# Patient Record
Sex: FEMALE | Race: BLACK OR AFRICAN AMERICAN | NOT HISPANIC OR LATINO | Employment: FULL TIME | URBAN - METROPOLITAN AREA
[De-identification: names, ages, dates, MRNs, and addresses within clinical notes are randomized per-mention and may not be internally consistent; named-entity substitution may affect disease eponyms.]

---

## 2017-01-03 ENCOUNTER — TELEPHONE (OUTPATIENT)
Dept: ORTHOPEDICS | Facility: CLINIC | Age: 27
End: 2017-01-03

## 2017-01-03 ENCOUNTER — OFFICE VISIT (OUTPATIENT)
Dept: ENDOCRINOLOGY | Facility: CLINIC | Age: 27
End: 2017-01-03
Payer: COMMERCIAL

## 2017-01-03 VITALS
SYSTOLIC BLOOD PRESSURE: 116 MMHG | HEIGHT: 67 IN | WEIGHT: 264.31 LBS | BODY MASS INDEX: 41.48 KG/M2 | HEART RATE: 110 BPM | DIASTOLIC BLOOD PRESSURE: 84 MMHG

## 2017-01-03 DIAGNOSIS — E66.01 MORBID OBESITY DUE TO EXCESS CALORIES: ICD-10-CM

## 2017-01-03 DIAGNOSIS — M25.561 RIGHT KNEE PAIN, UNSPECIFIED CHRONICITY: Primary | ICD-10-CM

## 2017-01-03 PROCEDURE — 1159F MED LIST DOCD IN RCRD: CPT | Mod: S$GLB,,, | Performed by: INTERNAL MEDICINE

## 2017-01-03 PROCEDURE — 99999 PR PBB SHADOW E&M-EST. PATIENT-LVL III: CPT | Mod: PBBFAC,,, | Performed by: INTERNAL MEDICINE

## 2017-01-03 PROCEDURE — 99214 OFFICE O/P EST MOD 30 MIN: CPT | Mod: S$GLB,,, | Performed by: INTERNAL MEDICINE

## 2017-01-03 RX ORDER — TESTOSTERONE CYPIONATE 200 MG/ML
100 INJECTION, SOLUTION INTRAMUSCULAR
Qty: 10 ML | Refills: 5 | Status: SHIPPED | OUTPATIENT
Start: 2017-01-03 | End: 2017-01-04

## 2017-01-03 NOTE — MR AVS SNAPSHOT
[facility] - Endo/Diab/Metab  [address]  [facility] [address]  Phone: [phone]  Fax: [phone]                  Lianne Mancia   1/3/2017 10:30 AM   Office Visit    Description:  Female : 1990   Provider:  Tania Rich MD   Department:  [facility] Endo/Diab/Metab           Reason for Visit     Gender dysphoria           Diagnoses this Visit        Comments    Endocrine disorder in female-to-male transgender person    -  Primary     Morbid obesity due to excess calories                To Do List           Future Appointments        Provider Department Dept Phone    1/3/2017 12:15 PM LAB, APPOINTMENT [place] [facility] [phone]    2017 8:00 AM [facility] - Orthopedics [phone]      Goals (5 Years of Data)     None      Follow-Up and Disposition     Return in about 3 months (around 4/3/2017).       These Medications        Disp Refills Start End    testosterone cypionate (DEPOTESTOTERONE CYPIONATE) 200 mg/mL injection 10 mL 5 1/3/2017 2017    Inject 0.5 mLs (100 mg total) into the muscle every 7 days. 3 ml syringe with 18 g needle  to draw  X 10   21 g 1 inch needle to inject x 10 - Intramuscular    Pharmacy: [facility] [place] [address].  #: [phone]         [facility]     [facility] Nurse Care [facility] -  Assistance  Registered nurses in the [facility] provide clinical advisement, health education, appointment booking, and other advisory services.  Call for this free service at [phone].             Medications           Message regarding Medications     Verify the changes and/or additions to your medication regime listed below are the same as discussed with your clinician today.  If any of these changes or additions are incorrect, please notify your healthcare provider.        CHANGE how you are taking these medications   "   Start Taking Instead of    testosterone cypionate (DEPOTESTOTERONE CYPIONATE) 200 mg/mL injection testosterone cypionate (DEPOTESTOTERONE CYPIONATE) 200 mg/mL injection    Dosage:  Inject 0.5 mLs (100 mg total) into the muscle every 7 days. 3 ml syringe with 18 g needle  to draw  X 10   21 g 1 inch needle to inject x 10 Dosage:  Inject 0.25 mLs (50 mg total) into the muscle every 7 days. 3 ml syringe with 18 g needle  to draw  X 10   21 g 1 inch needle to inject x 10    Reason for Change:  Reorder       STOP taking these medications     medroxyPROGESTERone (DEPO-PROVERA) 150 mg/mL injection Inject 150 mg into the muscle every 3 (three) months.           Verify that the below list of medications is an accurate representation of the medications you are currently taking.  If none reported, the list may be blank. If incorrect, please contact your healthcare provider. Carry this list with you in case of emergency.           Current Medications     meloxicam (MOBIC) 15 MG tablet Take 1 tablet (15 mg total) by mouth once daily.    testosterone cypionate (DEPOTESTOTERONE CYPIONATE) 200 mg/mL injection Inject 0.5 mLs (100 mg total) into the muscle every 7 days. 3 ml syringe with 18 g needle  to draw  X 10   21 g 1 inch needle to inject x 10           Clinical Reference Information           Vital Signs - Last Recorded  Most recent update: 1/3/2017 11:08 AM by Franco Mcfarland MA    BP Pulse Ht Wt BMI    116/84 110 5' 6.5" (1.689 m) 119.9 kg (264 lb 5.3 oz) 42.03 kg/m2      Blood Pressure          Most Recent Value    BP  116/84      Allergies as of 1/3/2017     No Known Allergies      Immunizations Administered on Date of Encounter - 1/3/2017     None      Orders Placed During Today's Visit     Future Labs/Procedures Expected by Expires    CBC auto differential  1/3/2017 1/3/2018    Comprehensive metabolic panel  1/3/2017 1/3/2018    Hemoglobin A1c  1/3/2017 1/3/2018    Lipid panel  1/3/2017 1/3/2018    TSH  1/3/2017 " 1/3/2018    Vitamin D  1/3/2017 1/3/2018

## 2017-01-03 NOTE — PROGRESS NOTES
Subjective:      Patient ID: Lianne Mancia is a 26 y.o. female.    Chief Complaint:  Gender dysphoria      History of Present Illness    Here for his  trans care    First identified as a man in       started cross hormone therapy 9/20/16  trt dose  50 mg q  Week - fridays   Was On depo provera -would be due for an injection this week   He is pleased with the changes so far  No concerns   He is self injecting          Therapist was Benny Fraga   Letter was provided attesting to readiness for TRT       Relationships women - new online relationship  - supposed to see her in April in [place] - she is not aware         Friend supportive   Mother not supportive but she aware (she does not know he is on trt)   his sister is supportive      Out at work? Work does not know           No recent menses  - lmp 2015 before provera        tob - no  etoh - no   Drugs - no     Denies polyuria, polydipsia, nocturia, unexplained weight loss or blurred vision  Able to lose weight with effort but it has not been a priority recently     Knee injury in October has limited ability to exercise     Review of Systems   Constitutional: Negative for unexpected weight change.   Eyes: Negative for visual disturbance.   Respiratory: Negative for shortness of breath.    Cardiovascular: Negative for chest pain.   Gastrointestinal: Negative for abdominal pain.   Musculoskeletal: Positive for arthralgias.   Skin: Negative for wound.   Neurological: Negative for headaches.   Hematological: Does not bruise/bleed easily.   Psychiatric/Behavioral: Negative for sleep disturbance.       Objective:   Physical Exam   Neck: No thyromegaly present.   Cardiovascular: Normal rate.    Pulmonary/Chest: Effort normal.   Abdominal: Soft.   Musculoskeletal: She exhibits no edema.   Vitals reviewed.  +acanthosis and skin tags       Lab Review:   Results for orders placed or performed in visit on 01/15/15   Iron and TIBC   Result Value Ref Range    Iron 68  30 - 160 ug/dL    Transferrin 290 200 - 375 mg/dL    TIBC 429 250 - 450 ug/dL    Saturated Iron 16 (L) 20 - 50 %   CBC auto differential   Result Value Ref Range    WBC 7.52 3.90 - 12.70 K/uL    RBC 4.20 4.00 - 5.40 M/uL    Hemoglobin 11.7 (L) 12.0 - 16.0 g/dL    Hematocrit 36.0 (L) 37.0 - 48.5 %    MCV 86 82 - 98 fL    MCH 27.9 27.0 - 31.0 pg    MCHC 32.5 32.0 - 36.0 %    RDW 15.2 (H) 11.5 - 14.5 %    Platelets 368 (H) 150 - 350 K/uL    MPV 11.4 9.2 - 12.9 fL    Gran # 3.6 1.8 - 7.7 K/uL    Lymph # 3.0 1.0 - 4.8 K/uL    Mono # 0.8 0.3 - 1.0 K/uL    Eos # 0.1 0.0 - 0.5 K/uL    Baso # 0.02 0.00 - 0.20 K/uL    Gran% 47.3 38.0 - 73.0 %    Lymph% 40.4 18.0 - 48.0 %    Mono% 10.5 4.0 - 15.0 %    Eosinophil% 1.1 0.0 - 8.0 %    Basophil% 0.3 0.0 - 1.9 %    Differential Method Automated    Ferritin   Result Value Ref Range    Ferritin 20 20.0 - 300.0 ng/mL         Assessment:   1 Gender dysphoria-  Reviewed therapy, side effects (both wanted and unwanted), possible adverse outcomes, expectations, compliance.  Reviewed limited data on fertility      Patient encouraged to continue working with his therapist during the transition process.      Will increase Testosterone replacement therapy 100 mg q  Week    labs today   RTO in 3 months with labs     Noting  Nl hh/ for men is:     Hemoglobin 14.0-18.0 g/dL    Hematocrit 40.0-54.0 %        Healthy lifestyle stressed  Discussed the need for a Pap.      2  Morbid obesity with insulin resistance on exam - alerted as to the increased risk for t2DM  Stressed diet and exercise  Goal 5-7% weight loss   May use metformin in the future   Periodic hba1c levels

## 2017-01-04 ENCOUNTER — HOSPITAL ENCOUNTER (OUTPATIENT)
Dept: RADIOLOGY | Facility: HOSPITAL | Age: 27
Discharge: HOME | End: 2017-01-04
Attending: PHYSICIAN ASSISTANT
Payer: COMMERCIAL

## 2017-01-04 ENCOUNTER — OFFICE VISIT (OUTPATIENT)
Dept: ORTHOPEDICS | Facility: CLINIC | Age: 27
End: 2017-01-04
Payer: COMMERCIAL

## 2017-01-04 VITALS
HEART RATE: 84 BPM | HEIGHT: 67 IN | WEIGHT: 264 LBS | BODY MASS INDEX: 41.44 KG/M2 | SYSTOLIC BLOOD PRESSURE: 147 MMHG | DIASTOLIC BLOOD PRESSURE: 93 MMHG

## 2017-01-04 DIAGNOSIS — M25.561 ACUTE PAIN OF RIGHT KNEE: Primary | ICD-10-CM

## 2017-01-04 DIAGNOSIS — S80.01XA CONTUSION OF KNEE, RIGHT: ICD-10-CM

## 2017-01-04 DIAGNOSIS — S72.411D CLOSED DISPLACED FRACTURE OF CONDYLE OF RIGHT FEMUR WITH ROUTINE HEALING, SUBSEQUENT ENCOUNTER: Primary | ICD-10-CM

## 2017-01-04 DIAGNOSIS — S83.004D PATELLAR DISLOCATION, RIGHT, SUBSEQUENT ENCOUNTER: ICD-10-CM

## 2017-01-04 DIAGNOSIS — M25.561 RIGHT KNEE PAIN, UNSPECIFIED CHRONICITY: ICD-10-CM

## 2017-01-04 PROCEDURE — 73562 X-RAY EXAM OF KNEE 3: CPT | Mod: 26,RT,, | Performed by: RADIOLOGY

## 2017-01-04 PROCEDURE — 99999 PR PBB SHADOW E&M-EST. PATIENT-LVL III: CPT | Mod: PBBFAC,,, | Performed by: PHYSICIAN ASSISTANT

## 2017-01-04 PROCEDURE — 1159F MED LIST DOCD IN RCRD: CPT | Mod: S$GLB,,, | Performed by: PHYSICIAN ASSISTANT

## 2017-01-04 PROCEDURE — 73560 X-RAY EXAM OF KNEE 1 OR 2: CPT | Mod: TC,59,LT

## 2017-01-04 PROCEDURE — 99024 POSTOP FOLLOW-UP VISIT: CPT | Mod: S$GLB,,, | Performed by: PHYSICIAN ASSISTANT

## 2017-01-04 PROCEDURE — 73560 X-RAY EXAM OF KNEE 1 OR 2: CPT | Mod: 26,59,, | Performed by: RADIOLOGY

## 2017-01-04 NOTE — PROGRESS NOTES
"Subjective:      Patient ID: Lianne Mancia is a 26 y.o. female.    Chief Complaint: Pain of the Right Knee    HPI   The patient presents to clinic for a four-week follow-up of right knee discomfort secondary to bone contusion and lateral femoral condyle fracture, lateral subluxation of patella and a tear of the medial patellar retinaculum. The patient sustained the injury on 10/24/16.  He states that overall his pain has significantly improved.  He continues to wear the hinged brace most of the time but takes it off whenever he is around the house.  He states that without it, he feels a bit unstable and starts to have a "sore" feeling on both sides of his knee.  He denies "giving out sensation"  of his knee.  He currently does not complain of any pain.    Review of Systems   Constitution: Negative for chills and fever.   Gastrointestinal: Negative for abdominal pain, diarrhea, nausea and vomiting.         Objective:        Ortho/SPM Exam   The patient does not have any pain or tenderness on exam today.  Cruciate and collateral ligaments are intact.   His swelling has significantly improved.  Range of motion is approximately 0-110.  He ambulates with a normal gait.      X-rays:   Comparison: 12/05/2016    Bilateral standing ap knees  bilateral sunrise views  right lateral    Findings:    Stable appearance post traumatic findings lung the lateral margin the RIGHT lateral femoral condyle.  Persistent but decreased RIGHT to suprapatellar effusion.  Persistent small linear calcific density noted projecting on the posterior inferior margin of the RIGHT patella on the lateral view.  Slight difference in appearance most likely positional.  Remainder of the exam is stable.  There is RIGHT juxta-articular osteopenia.        Assessment:       Encounter Diagnoses   Name Primary?    Closed displaced fracture of condyle of right femur with routine healing, subsequent encounter Yes    Patellar dislocation, right, " subsequent encounter     Contusion of knee, right           Plan:       Lianne was seen today for pain.    Diagnoses and all orders for this visit:    Closed displaced fracture of condyle of right femur with routine healing, subsequent encounter    Patellar dislocation, right, subsequent encounter    Contusion of knee, right      Patient will continue wearing the hinged knee brace as needed.  He is cleared to resume regular duty work.  The patient was instructed on at-home exercises. The patient does not want to go to outpatient physical therapy.    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  He is wanting to work at [employer].  If the patient has any questions, concerns or increased pain he'll return in 6 weeks for follow-up.

## 2017-01-04 NOTE — MR AVS SNAPSHOT
[facility]  [address]  [place] [address]  Phone: [phone]  Fax: [phone]                  Lianne Mancia   2017 8:00 AM   Office Visit    Description:  Female : 1990   Provider:  Luciana Larsen PA-C   Department:  [facility]           Reason for Visit     Right Knee - Pain                To Do List           Future Appointments        Provider Department Dept Phone    2/15/2017 7:45 AM ONLH XR1- [facility] [phone]    2/15/2017 8:15 AM Luciana Larsen PA-C [facility] [phone]    2017 9:30 AM Tania Rihc MD [facility] - Endo/Diab/Metab [phone]      Goals (5 Years of Data)     None      Follow-Up and Disposition     Return in about 6 weeks (around 2/15/2017), or if symptoms worsen or fail to improve.      [facility]     [facility] Care [facility] -  Assistance  Registered nurses in the [facility] provide clinical advisement, health education, appointment booking, and other advisory services.  Call for this free service at [phone].             Medications           Message regarding Medications     Verify the changes and/or additions to your medication regime listed below are the same as discussed with your clinician today.  If any of these changes or additions are incorrect, please notify your healthcare provider.        STOP taking these medications     testosterone cypionate (DEPOTESTOTERONE CYPIONATE) 200 mg/mL injection Inject 0.5 mLs (100 mg total) into the muscle every 7 days. 3 ml syringe with 18 g needle  to draw  X 10   21 g 1 inch needle to inject x 10           Verify that the below list of medications is an accurate representation of the medications you are currently taking.  If none reported, the list may be blank. If incorrect, please contact your healthcare provider. Carry this list with you in case of emergency.           Current  "Medications     meloxicam (MOBIC) 15 MG tablet Take 1 tablet (15 mg total) by mouth once daily.           Clinical Reference Information           Vital Signs - Last Recorded  Most recent update: 1/4/2017  8:23 AM by Katy Tee LPN    BP Pulse Ht Wt BMI    (!) 147/93 (BP Location: Right arm, Patient Position: Sitting, BP Method: Automatic) 84 5' 6.5" (1.689 m) 119.7 kg (264 lb) 41.97 kg/m2      Blood Pressure          Most Recent Value    BP  (!)  147/93      Allergies as of 1/4/2017     No Known Allergies      Immunizations Administered on Date of Encounter - 1/4/2017     None      "

## 2017-01-19 ENCOUNTER — TELEPHONE (OUTPATIENT)
Dept: ORTHOPEDICS | Facility: CLINIC | Age: 27
End: 2017-01-19

## 2017-01-19 NOTE — TELEPHONE ENCOUNTER
----- Message from Paolo Meza sent at 1/19/2017  7:48 AM CST -----  Contact: [phone]  Pt is calling to find out if paperwork for work is ready to be picked up. Pt can be reached at [phone]

## 2017-01-19 NOTE — TELEPHONE ENCOUNTER
Spoke with patient and let him know that his paper work should be ready tomorrow and I will call him when it is ready for .

## 2017-01-20 ENCOUNTER — TELEPHONE (OUTPATIENT)
Dept: ORTHOPEDICS | Facility: CLINIC | Age: 27
End: 2017-01-20

## 2017-02-15 ENCOUNTER — OFFICE VISIT (OUTPATIENT)
Dept: ORTHOPEDICS | Facility: CLINIC | Age: 27
End: 2017-02-15
Payer: COMMERCIAL

## 2017-02-15 ENCOUNTER — HOSPITAL ENCOUNTER (OUTPATIENT)
Dept: RADIOLOGY | Facility: HOSPITAL | Age: 27
Discharge: HOME | End: 2017-02-15
Attending: ORTHOPAEDIC SURGERY
Payer: COMMERCIAL

## 2017-02-15 VITALS
DIASTOLIC BLOOD PRESSURE: 86 MMHG | SYSTOLIC BLOOD PRESSURE: 136 MMHG | WEIGHT: 250 LBS | HEART RATE: 87 BPM | BODY MASS INDEX: 40.18 KG/M2 | HEIGHT: 66 IN

## 2017-02-15 DIAGNOSIS — S72.411D CLOSED DISPLACED FRACTURE OF CONDYLE OF RIGHT FEMUR WITH ROUTINE HEALING, SUBSEQUENT ENCOUNTER: Primary | ICD-10-CM

## 2017-02-15 DIAGNOSIS — M25.561 ACUTE PAIN OF RIGHT KNEE: ICD-10-CM

## 2017-02-15 DIAGNOSIS — S86.811D: ICD-10-CM

## 2017-02-15 DIAGNOSIS — S83.004D PATELLAR DISLOCATION, RIGHT, SUBSEQUENT ENCOUNTER: ICD-10-CM

## 2017-02-15 DIAGNOSIS — S80.01XA CONTUSION OF KNEE, RIGHT: ICD-10-CM

## 2017-02-15 DIAGNOSIS — S83.001D: ICD-10-CM

## 2017-02-15 PROCEDURE — 73560 X-RAY EXAM OF KNEE 1 OR 2: CPT | Mod: 26,59,LT, | Performed by: RADIOLOGY

## 2017-02-15 PROCEDURE — 99999 PR PBB SHADOW E&M-EST. PATIENT-LVL III: CPT | Mod: PBBFAC,,, | Performed by: PHYSICIAN ASSISTANT

## 2017-02-15 PROCEDURE — 73560 X-RAY EXAM OF KNEE 1 OR 2: CPT | Mod: TC,59,LT

## 2017-02-15 PROCEDURE — 73562 X-RAY EXAM OF KNEE 3: CPT | Mod: 26,RT,, | Performed by: RADIOLOGY

## 2017-02-15 PROCEDURE — 99214 OFFICE O/P EST MOD 30 MIN: CPT | Mod: S$GLB,,, | Performed by: PHYSICIAN ASSISTANT

## 2017-02-15 RX ORDER — SYRINGE WITH NEEDLE, 1 ML 25GX5/8"
SYRINGE, EMPTY DISPOSABLE MISCELLANEOUS
Refills: 0 | COMMUNITY
Start: 2017-01-18 | End: 2017-12-09

## 2017-02-15 RX ORDER — TESTOSTERONE CYPIONATE 200 MG/ML
200 INJECTION, SOLUTION INTRAMUSCULAR
Refills: 0 | COMMUNITY
Start: 2017-01-18 | End: 2017-06-19

## 2017-02-15 NOTE — PROGRESS NOTES
Subjective:      Patient ID: Lianne Mancia is a 26 y.o. female.    Chief Complaint: Injury of the Right Knee    HPI Comments: Body part: Right Knee    Occupation: Sterile Processing Technician/ [employer]    Dominant hand: Right    Referred by: Riri Monson MD    Date of Injury: 10/22/2016    Problem Description: Right Knee Injury from a fall.  She suffered a lateral patellar dislocation with lateral femoral condyle fracture and medial retinacular tear.  There is diffuse bone bruising in the finger.  She has worn a hinged knee brace quite consistently.  She continues to have crepitus in the knee that she is concerned about.  No significant pain otherwise.  There is minimal swelling.          Injury   Episode onset: 10/22/2016. The problem occurs rarely. The problem has been gradually improving. Pertinent negatives include no abdominal pain, chest pain, chills, congestion, coughing, fever, joint swelling, nausea, numbness, rash or vomiting. Nothing aggravates the symptoms. She has tried NSAIDs (knee brace) for the symptoms. The treatment provided moderate relief.       Review of Systems   Constitution: Negative for chills, fever and weight loss.   HENT: Negative for congestion and hearing loss.    Eyes: Negative for double vision and pain.   Cardiovascular: Negative for chest pain and irregular heartbeat.   Respiratory: Negative for cough and shortness of breath.    Endocrine: Negative for polyuria.   Hematologic/Lymphatic: Does not bruise/bleed easily.   Skin: Negative for poor wound healing, rash and suspicious lesions.   Musculoskeletal: Negative for arthritis and joint swelling.   Gastrointestinal: Negative for abdominal pain, nausea and vomiting.   Genitourinary: Negative for bladder incontinence and frequency.   Neurological: Negative for loss of balance, numbness, paresthesias, sensory change and tremors.   Psychiatric/Behavioral: Positive for depression. The patient is  nervous/anxious.    Allergic/Immunologic: Negative for hives.         Objective:            General    Nursing note and vitals reviewed.  Constitutional: She is oriented to person, place, and time. She appears well-developed and well-nourished. No distress.   Neurological: She is alert and oriented to person, place, and time.   Psychiatric: She has a normal mood and affect. Her behavior is normal. Judgment and thought content normal.     General Musculoskeletal Exam   Gait: normal       Right Knee Exam     Inspection   Scars: absent  Swelling: present  Effusion: present  Deformity: deformity  Bruising: absent    Tenderness   The patient is tender to palpation of the patella.    Crepitus   The patient has crepitus of the patella.    Range of Motion   The patient has normal right knee ROM.    Tests   Ligament Examination   MCL - Valgus: normal (0 to 2mm)  LCL - Varus: normal  Patella   Passive Patellar Tilt: lateral tilt  Patellar Grind: positive    Other   Sensation: normal    Left Knee Exam   Left knee exam is normal.    Range of Motion   The patient has normal left knee ROM.    Tests   Stability   MCL - Valgus: normal (0 to 2mm)  LCL - Varus: normal (0 to 2mm)  Patella   Passive Patellar Tilt: squinting  Patellar Grind: negative    Other   Sensation: normal    Muscle Strength   Right Lower Extremity   Hip Abduction: 4/5   Quadriceps:  4/5   Hamstrin/5   Left Lower Extremity   Hip Abduction: 4/5   Quadriceps:  4/5 and 5/5   Hamstrin/5 and 4/5     Vascular Exam       Edema  Right Lower Leg: absent  Left Lower Leg: absent    I have reviewed the films and report. I agree with the radiologist interpretation of the radiographic findings:  The joint spaces are well-maintained.  There is a lucency seen along the lateral aspect of the right lateral femoral condyle which is presumed to represent the site of prior fracture.  A fracture fragment is not as well demonstrated as the prior exam.        Assessment:        Encounter Diagnoses   Name Primary?    Closed displaced fracture of condyle of right femur with routine healing, subsequent encounter Yes    Patellar dislocation, right, subsequent encounter     Contusion of knee, right     Subluxation of patella, right, subsequent encounter     Traumatic medial retinacular tear of knee, right, subsequent encounter           Plan:       Lianne was seen today for injury.    Diagnoses and all orders for this visit:    Closed displaced fracture of condyle of right femur with routine healing, subsequent encounter  -     Ambulatory Referral to Physical/Occupational Therapy    Patellar dislocation, right, subsequent encounter  -     Ambulatory Referral to Physical/Occupational Therapy    Contusion of knee, right  -     Ambulatory Referral to Physical/Occupational Therapy    Subluxation of patella, right, subsequent encounter  -     Ambulatory Referral to Physical/Occupational Therapy    Traumatic medial retinacular tear of knee, right, subsequent encounter  -     Ambulatory Referral to Physical/Occupational Therapy      We reviewed the x-rays in the room.  There is significant crepitus on examination.  There is concern that she may need an MPFL reconstruction, but I like to have her complete a round of physical therapy prior to considering this to specifically concentrate on VMO toning to better control the patella.  I'll see her back in approximately 7 weeks with consideration for repeat MRI.  She will continue the hinged knee brace while at work  The patient understands, chooses and consents to this plan and accepts all   the risks which include but are not limited to the risks mentioned above.   Pt understands the alternative of having no testing, intervention or       treatment at this time. Pt left content and without questions.     Disclaimer: This note was prepared using a voice recognition system and is likely to have sound alike errors within the text.

## 2017-02-15 NOTE — LETTER
February 15, 2017      Riri Monson MD  [address] [place] [address] Dr [address] [place] [id number]           [facility]  [address] [place] [address] Nestor [place] [address]  Phone: [phone]  Fax: [phone]          Patient: Lianne Mancia   MR Number: [id number]   YOB: 1990   Date of Visit: 2/15/2017       Dear Dr. Riri Monson:    Thank you for referring Lianne Mancia to me for evaluation. Attached you will find relevant portions of my assessment and plan of care.    If you have questions, please do not hesitate to call me. I look forward to following Lianne Mancia along with you.    Sincerely,    Dominic Mcgowan PA-C    Enclosure  CC:  No Recipients    If you would like to receive this communication electronically, please contact [email or web address] or [phone] to request more information on [address] Link access.    For providers and/or their staff who would like to refer a patient to [facility], please contact us through our one-stop-shop provider referral line, [facility], at [phone].    If you feel you have received this communication in error or would no longer like to receive these types of communications, please e-mail [email or web address]

## 2017-02-27 ENCOUNTER — CLINICAL SUPPORT (OUTPATIENT)
Dept: REHABILITATION | Facility: HOSPITAL | Age: 27
End: 2017-02-27
Attending: ORTHOPAEDIC SURGERY
Payer: COMMERCIAL

## 2017-02-27 DIAGNOSIS — M25.561 RIGHT KNEE PAIN, UNSPECIFIED CHRONICITY: Primary | ICD-10-CM

## 2017-02-27 PROCEDURE — 97014 ELECTRIC STIMULATION THERAPY: CPT

## 2017-02-27 PROCEDURE — 97140 MANUAL THERAPY 1/> REGIONS: CPT

## 2017-02-27 PROCEDURE — 97110 THERAPEUTIC EXERCISES: CPT

## 2017-02-27 PROCEDURE — 97161 PT EVAL LOW COMPLEX 20 MIN: CPT

## 2017-02-27 NOTE — PROGRESS NOTES
PHYSICAL THERAPY INITIAL OUTPATIENT EVALUATION    Referring Provider:  Dr. Noé Isabel    Diagnosis:       ICD-10-CM ICD-9-CM    1. Right knee pain, unspecified chronicity M25.561 719.46         Orders:  Evaluate and Treat  Date : 02/27/2017    Visit # 1    SUBJECTIVE:  Date of Injury: 10/22/2016  Right Knee Injury from a fall. She suffered a lateral patellar dislocation with lateral femoral condyle fracture and medial retinacular tear. There is diffuse bone bruising in the finger. She has worn a hinged knee brace almost all the time. Is most concerned with crepitus in the knee but reports no significant pain otherwise. There is minimal swelling remaining- was much worse.  Avoids squatting, kneeling. If walks for a long time will get general ache.     Pain: 4-5 /10 at worst, located peripatellar, described as dull ache    OBJECTIVE:    Function:  LEFS 26 % disability .    Posture/ Observation: no significant swelling/ no bruising    Gait: decreased WB / stance on right           EVAL       TODAY       Knee AROM:  Flexion      -15      Extension    115  Knee PROM  Flexion      -10      Extension    120     Hip ROM: WFL     Strength:    Quadriceps    4/5 pain    Hamstrings    5/5  Gluteus Medius   4/5   Gluteus Zeke   4+/5         Special Testing:   Ligamentous Stress: negative for all  Yuridias:    negative  Patellar Compression: positive     SLR/ Slump:  positive     Decreased tibial internal rotation , loss of full femoral/tibial extension    Palpation:  Significant crepitus on flex/ ext . Minimal tenderness on palpation to patella. Tender lateral quads / ITB     ASSESSMENT:  The patient is a 26 y.o. year old female who presents to physical therapy with complaints of patellar crepitus and knee aching following patellar subluxation and condyle fracture.  Patient's impairments include crepitus, pain, joint dysfunction and impaired gait.  These impairments are limiting patient's ability to perform work  functions and ADLs.  Patient's prognosis is good.  Patient will benefit from skilled physical therapy intervention to increase lower extremity strength and joint mobility. .    Short Term Goals:    1. Pt will report a subjective decrease in pain.   2. Pt will be instructed in home exercise program / self care   3. Pt to ambulate household distances without pain    Long Term Goals:  1. Full knee extension and flexion without pain  2. Pt able to ambulate community distances with little to no pain.  3. Quadricep strength 5/5 without pain  4. Pt able to squat to pick object off floor without pain/ crepitus.     TREATMENT PROVIDED:  -Evaluation  -Manual Therapy:  Tibial internal rotation mobs and extension mobs at fem/tib, patellar inf / sup glides   -Therapeutic Exercise:  QS 3 x 10 , SLR 3 x 10, clams 10 x 10 sec , tailgaters x 3 mins   -Modalities: IFC/ MH  -Education on condition and HEP    PLAN:  Patient will benefit from physical therapy (2-3) x/week for (4-6) weeks including manual therapy, therapeutic exercise, functional activities, modalities, and patient education.    Thank you for this referral.    These services are reasonable and necessary for the conditions set forth above while under my care.

## 2017-03-01 ENCOUNTER — CLINICAL SUPPORT (OUTPATIENT)
Dept: REHABILITATION | Facility: HOSPITAL | Age: 27
End: 2017-03-01
Attending: ORTHOPAEDIC SURGERY
Payer: COMMERCIAL

## 2017-03-01 DIAGNOSIS — M25.561 RIGHT KNEE PAIN, UNSPECIFIED CHRONICITY: Primary | ICD-10-CM

## 2017-03-01 PROCEDURE — 97110 THERAPEUTIC EXERCISES: CPT

## 2017-03-01 PROCEDURE — 97140 MANUAL THERAPY 1/> REGIONS: CPT

## 2017-03-01 PROCEDURE — 97014 ELECTRIC STIMULATION THERAPY: CPT

## 2017-03-01 NOTE — PROGRESS NOTES
PHYSICAL THERAPY OUTPATIENT VISIT    Referring Provider:  Dr. Noé Isabel    Diagnosis:       ICD-10-CM ICD-9-CM    1. Right knee pain, unspecified chronicity M25.561 719.46         Orders:  Evaluate and Treat  Date : 03/01/2017    Visit # 2    SUBJECTIVE:  Patient states they tolerated eval well. Doing HEP . When aches, pain is centrally and about patella.     Date of Injury: 10/22/2016  Right Knee Injury from a fall. She suffered a lateral patellar dislocation with lateral femoral condyle fracture and medial retinacular tear. There is diffuse bone bruising in the finger. She has worn a hinged knee brace almost all the time. Is most concerned with crepitus in the knee but reports no significant pain otherwise. There is minimal swelling remaining- was much worse.  Avoids squatting, kneeling. If walks for a long time will get general ache.       OBJECTIVE:                  TODAY     Knee AROM:  Extension   -10      Flexion    115  Knee PROM  Extension   -5      Flexion    120     TREATMENT PROVIDED:  -Manual Therapy:  Extension mobs at fem/tib, patellar inf / sup glides , STM to distal quads and medial patellar retinaculum  -Therapeutic Exercise:  Bike x 4 mins ,QS 3 x 10 , SLR 3 x 10 flexion and abduction, clams 10 x 10 sec , bridges x 30 , tailgaters x 3 mins   -Modalities: IFC/   -Education on condition and HEP      ASSESSMENT:  The patient is a 26 y.o. year old female who presented to physical therapy with complaints of patellar crepitus and knee aching following patellar subluxation and condyle fracture.    Progressing well with exercises with excellent effort for all and good tolerance     Short Term Goals:    1. Pt will report a subjective decrease in pain.   2. Pt will be instructed in home exercise program / self care   3. Pt to ambulate household distances without pain    Long Term Goals:  1. Full knee extension and flexion without pain  2. Pt able to ambulate community distances with little to no  pain.  3. Quadricep strength 5/5 without pain  4. Pt able to squat to pick object off floor without pain/ crepitus.       PLAN:  Patient will benefit from physical therapy (2-3) x/week for (4-6) weeks including manual therapy, therapeutic exercise, functional activities, modalities, and patient education.    Thank you for this referral.    These services are reasonable and necessary for the conditions set forth above while under my care.

## 2017-03-08 ENCOUNTER — CLINICAL SUPPORT (OUTPATIENT)
Dept: REHABILITATION | Facility: HOSPITAL | Age: 27
End: 2017-03-08
Attending: ORTHOPAEDIC SURGERY
Payer: COMMERCIAL

## 2017-03-08 DIAGNOSIS — M25.561 RIGHT KNEE PAIN, UNSPECIFIED CHRONICITY: Primary | ICD-10-CM

## 2017-03-08 PROCEDURE — 97110 THERAPEUTIC EXERCISES: CPT

## 2017-03-08 PROCEDURE — 97014 ELECTRIC STIMULATION THERAPY: CPT

## 2017-03-08 PROCEDURE — 97140 MANUAL THERAPY 1/> REGIONS: CPT

## 2017-03-08 NOTE — PROGRESS NOTES
"PHYSICAL THERAPY OUTPATIENT VISIT    Referring Provider:  Dr. Noé Isabel    Diagnosis:       ICD-10-CM ICD-9-CM    1. Right knee pain, unspecified chronicity M25.561 719.46         Orders:  Evaluate and Treat  Date : 03/08/2017    Visit # 2    SUBJECTIVE:  Patient reports doing HEP . Still feels a "pulling " when squatting , stairs .     Date of Injury: 10/22/2016  Right Knee Injury from a fall. She suffered a lateral patellar dislocation with lateral femoral condyle fracture and medial retinacular tear. There is diffuse bone bruising in the finger. She has worn a hinged knee brace almost all the time. Is most concerned with crepitus in the knee but reports no significant pain otherwise. There is minimal swelling remaining- was much worse.  Avoids squatting, kneeling. If walks for a long time will get general ache.       OBJECTIVE:                TODAY     Knee AROM:  Extension   -5      Flexion    115  Knee PROM  Extension   WFL      Flexion    120     TREATMENT PROVIDED:  -Manual Therapy:  Extension mobs at fem/tib, patellar inf / sup glides , STM to distal quads and medial patellar retinaculum  -Therapeutic Exercise:  Bike x 6 mins ,QS 3 x 10 with 5 sec hold with ankle propped on roll. ,  SLR 3 x 10 flexion and abduction, clams 10 x 10 sec , bridges x 30 , tailgaters x 3 mins , shuttle squats 4 bands x 30   -Modalities: IFC/ MH  -Education on condition and HEP      ASSESSMENT:  The patient is a 26 y.o. year old female who presented to physical therapy with complaints of patellar crepitus and knee aching following patellar subluxation and condyle fracture.    Progressing well overall towards goals set. Has good understanding of treatment plan and excellent effort for all and good tolerance . Increased there ex today     Short Term Goals:    1. Pt will report a subjective decrease in pain.   2. Pt will be instructed in home exercise program / self care   3. Pt to ambulate household distances without " pain    Long Term Goals:  1. Full knee extension and flexion without pain  2. Pt able to ambulate community distances with little to no pain.  3. Quadricep strength 5/5 without pain  4. Pt able to squat to pick object off floor without pain/ crepitus.       PLAN:  Patient will benefit from physical therapy (2-3) x/week for (4-6) weeks including manual therapy, therapeutic exercise, functional activities, modalities, and patient education.    Thank you for this referral.    These services are reasonable and necessary for the conditions set forth above while under my care.

## 2017-03-10 ENCOUNTER — CLINICAL SUPPORT (OUTPATIENT)
Dept: REHABILITATION | Facility: HOSPITAL | Age: 27
End: 2017-03-10
Attending: ORTHOPAEDIC SURGERY
Payer: COMMERCIAL

## 2017-03-10 DIAGNOSIS — M25.561 RIGHT KNEE PAIN, UNSPECIFIED CHRONICITY: Primary | ICD-10-CM

## 2017-03-10 PROCEDURE — 97140 MANUAL THERAPY 1/> REGIONS: CPT

## 2017-03-10 PROCEDURE — 97014 ELECTRIC STIMULATION THERAPY: CPT

## 2017-03-10 PROCEDURE — 97110 THERAPEUTIC EXERCISES: CPT

## 2017-03-10 NOTE — PROGRESS NOTES
PHYSICAL THERAPY OUTPATIENT VISIT    Referring Provider:  Dr. oNé Isabel    Diagnosis:       ICD-10-CM ICD-9-CM    1. Right knee pain, unspecified chronicity M25.561 719.46         Orders:  Evaluate and Treat  Date : 03/10/2017    Visit # 3    SUBJECTIVE:  Patient reports doing well overall. Yesterday did well all day until the evening when had gotten home from work. Anterior right knee - aching .  .     Date of Injury: 10/22/2016  Right Knee Injury from a fall. She suffered a lateral patellar dislocation with lateral femoral condyle fracture and medial retinacular tear. She has worn a hinged knee brace almost all the time. Is most concerned with crepitus in the knee but reports no significant pain otherwise. There is minimal swelling remaining- was much worse.  Avoids squatting, kneeling. If walks for a long time will get general ache.       OBJECTIVE:                  TODAY     Knee AROM:  Extension   WFL      Flexion    115  Knee PROM  Extension   WFL      Flexion    120     TREATMENT PROVIDED:  -Manual Therapy:  , patellar inf / sup glides , STM to distal quads and medial patellar retinaculum  -Therapeutic Exercise:  Bike x 6 mins ,QS 3 x 10 with 5 sec hold with ankle propped on roll. ,  SLR 3 x 10 flexion and abduction, clams 10 x 10 sec , bridges x 30 , tailgaters x 3 mins , shuttle squats 4 bands x 3mins   -Modalities: IFC/ MH  -Education on condition and HEP      ASSESSMENT:  The patient is a 26 y.o. year old female who presented to physical therapy with complaints of patellar crepitus and knee aching following patellar subluxation and condyle fracture.    Extension improved to full ROM and continues to make progress towards goals set. Has good understanding of treatment plan and excellent effort for all and good tolerance .     Short Term Goals:    1. Pt will report a subjective decrease in pain.    MET  2. Pt will be instructed in home exercise program / self care  MET  3. Pt to ambulate household  distances without pain    Long Term Goals:  1. Full knee extension and flexion without pain  2. Pt able to ambulate community distances with little to no pain.  3. Quadricep strength 5/5 without pain  4. Pt able to squat to pick object off floor without pain/ crepitus.       PLAN:  Patient will benefit from continuing (2-3) x/week for (3-4) weeks including manual therapy, therapeutic exercise, functional activities, modalities, and patient education.    Thank you for this referral.    These services are reasonable and necessary for the conditions set forth above while under my care.

## 2017-03-16 ENCOUNTER — CLINICAL SUPPORT (OUTPATIENT)
Dept: REHABILITATION | Facility: HOSPITAL | Age: 27
End: 2017-03-16
Attending: ORTHOPAEDIC SURGERY
Payer: COMMERCIAL

## 2017-03-16 DIAGNOSIS — M25.561 RIGHT KNEE PAIN, UNSPECIFIED CHRONICITY: Primary | ICD-10-CM

## 2017-03-16 PROCEDURE — 97014 ELECTRIC STIMULATION THERAPY: CPT

## 2017-03-16 PROCEDURE — 97110 THERAPEUTIC EXERCISES: CPT

## 2017-03-16 PROCEDURE — 97140 MANUAL THERAPY 1/> REGIONS: CPT

## 2017-03-16 NOTE — PROGRESS NOTES
"PHYSICAL THERAPY OUTPATIENT VISIT    Referring Provider:  Dr. Noé Isabel    Diagnosis:       ICD-10-CM ICD-9-CM    1. Right knee pain, unspecified chronicity M25.561 719.46         Orders:  Evaluate and Treat  Date : 03/16/2017    Visit # 3    SUBJECTIVE:  Patient reports she has had some increased sensations of her knee "catching" over the past few days. Is not wearing brace today.     Date of Injury: 10/22/2016  Right Knee Injury from a fall. She suffered a lateral patellar dislocation with lateral femoral condyle fracture and medial retinacular tear. She has worn a hinged knee brace almost all the time. Is most concerned with crepitus in the knee but reports no significant pain otherwise. There is minimal swelling remaining- was much worse.  Avoids squatting, kneeling. If walks for a long time will get general ache.       OBJECTIVE:                  TODAY     Knee AROM:  Extension   WFL      Flexion    115  Knee PROM  Extension   WFL      Flexion    120     Joint Mobility: decreased internal tibial rotation    Gait: Ambulates with right lower leg in ext rotation- foot turned out    TREATMENT PROVIDED:  -Manual Therapy:  , patellar inf / sup glides , internal tibial rotation mobs / mobs with movement  -Therapeutic Exercise:  Bike x 6 mins ,QS 3 x 10 with 5 sec hold with ankle propped on roll. ,  SLR 3 x 10 flexion and abduction, clams 10 x 10 sec , bridges x 30 , tailgaters x 3 mins , standing hip abd with green band 3 x 10 bilaterally , step ups with right x 30 , step down/ heel taps with no step- x 20    -Modalities: IFC/ MH  -Education on condition and HEP      ASSESSMENT:  The patient is a 26 y.o. year old female who presented to physical therapy with complaints of patellar crepitus and knee aching following patellar subluxation and condyle fracture.    Mild loss of full extension with knee held flexed . Holds right lower leg in external rotation with foot turned out- decreased tibial internal rotation. " Tolerated mobs for such well.  Has good understanding of treatment plan and excellent effort for all and good tolerance .     Short Term Goals:    1. Pt will report a subjective decrease in pain.    MET  2. Pt will be instructed in home exercise program / self care  MET  3. Pt to ambulate household distances without pain    Long Term Goals:  1. Full knee extension and flexion without pain  2. Pt able to ambulate community distances with little to no pain.  3. Quadricep strength 5/5 without pain  4. Pt able to squat to pick object off floor without pain/ crepitus.       PLAN:  Patient will benefit from continuing (2-3) x/week for (3-4) weeks including manual therapy, therapeutic exercise, functional activities, modalities, and patient education.    Thank you for this referral.    These services are reasonable and necessary for the conditions set forth above while under my care.

## 2017-03-22 ENCOUNTER — CLINICAL SUPPORT (OUTPATIENT)
Dept: REHABILITATION | Facility: HOSPITAL | Age: 27
End: 2017-03-22
Attending: ORTHOPAEDIC SURGERY
Payer: COMMERCIAL

## 2017-03-22 DIAGNOSIS — M25.561 RIGHT KNEE PAIN, UNSPECIFIED CHRONICITY: Primary | ICD-10-CM

## 2017-03-22 PROCEDURE — 97110 THERAPEUTIC EXERCISES: CPT

## 2017-03-22 PROCEDURE — 97014 ELECTRIC STIMULATION THERAPY: CPT

## 2017-03-22 PROCEDURE — 97140 MANUAL THERAPY 1/> REGIONS: CPT

## 2017-03-22 NOTE — PROGRESS NOTES
PHYSICAL THERAPY OUTPATIENT VISIT    Referring Provider:  Dr. Noé Isabel    Diagnosis:       ICD-10-CM ICD-9-CM    1. Right knee pain, unspecified chronicity M25.561 719.46         Orders:  Evaluate and Treat  Date : 03/22/2017    Visit # 4    SUBJECTIVE:  Patient reports she has been feeling better over the past few days. Has a slight feeling of weakness in single leg stance / initial heel strike on left.        Date of Injury: 10/22/2016  Right Knee Injury from a fall. She suffered a lateral patellar dislocation with lateral femoral condyle fracture and medial retinacular tear. She has worn a hinged knee brace almost all the time. Is most concerned with crepitus in the knee but reports no significant pain otherwise. There is minimal swelling remaining- was much worse.  Avoids squatting, kneeling. If walks for a long time will get general ache.       OBJECTIVE:                  TODAY     Knee AROM:  Extension   WFL      Flexion    115  Knee PROM  Extension   WFL      Flexion    120     Joint Mobility: decreased internal tibial rotation    Gait: Ambulates with right lower leg in ext rotation- foot turned out    TREATMENT PROVIDED:  -Manual Therapy:  , patellar inf / sup glides , internal tibial rotation mobs / mobs with movement  -Therapeutic Exercise:  Bike x 6 mins ,QS 3 x 10 with 5 sec hold with ankle propped on roll. ,  SLR 3 x 10 flexion and abduction, clams 10 x 10 sec , bridges x 30 , tailgaters x 3 mins , standing hip abd and ext with green band 3 x 10 bilaterally , step ups with right x 30 , step down/ heel taps with no step- x 20  , TKE with green band 4 x 10   -Modalities: IFC/ MH  -Education on condition and HEP      ASSESSMENT:  The patient is a 26 y.o. year old female who presented to physical therapy with complaints of patellar crepitus and knee aching following patellar subluxation and condyle fracture.    Progressing well with good effort for all . Tolerated new exercises well.     Short Term  Goals:    1. Pt will report a subjective decrease in pain.    MET  2. Pt will be instructed in home exercise program / self care  MET  3. Pt to ambulate household distances without pain  MET    Long Term Goals:  1. Full knee extension and flexion without pain    ALMOST MET  2. Pt able to ambulate community distances with little to no pain.  3. Quadricep strength 5/5 without pain  4. Pt able to squat to pick object off floor without pain/ crepitus.       PLAN:  Patient will benefit from continuing (2-3) x/week for (3-4) weeks including manual therapy, therapeutic exercise, functional activities, modalities, and patient education.    Thank you for this referral.    These services are reasonable and necessary for the conditions set forth above while under my care.

## 2017-03-24 ENCOUNTER — CLINICAL SUPPORT (OUTPATIENT)
Dept: REHABILITATION | Facility: HOSPITAL | Age: 27
End: 2017-03-24
Attending: ORTHOPAEDIC SURGERY
Payer: COMMERCIAL

## 2017-03-24 DIAGNOSIS — M25.561 RIGHT KNEE PAIN, UNSPECIFIED CHRONICITY: Primary | ICD-10-CM

## 2017-03-24 PROCEDURE — 97110 THERAPEUTIC EXERCISES: CPT

## 2017-03-24 PROCEDURE — 97140 MANUAL THERAPY 1/> REGIONS: CPT

## 2017-03-24 NOTE — PROGRESS NOTES
"PHYSICAL THERAPY OUTPATIENT VISIT    Referring Provider:  Dr. Noé Isabel    Diagnosis:       ICD-10-CM ICD-9-CM    1. Right knee pain, unspecified chronicity M25.561 719.46         Orders:  Evaluate and Treat  Date : 03/24/2017    Visit # 7    SUBJECTIVE:  Patient reports doing well. Some occasional feelings of "catching" in her knee. No pain at anytime other than slight pain when it "catches" .      Date of Injury: 10/22/2016  Right Knee Injury from a fall. She suffered a lateral patellar dislocation with lateral femoral condyle fracture and medial retinacular tear. She has worn a hinged knee brace almost all the time. Is most concerned with crepitus in the knee but reports no significant pain otherwise. There is minimal swelling remaining- was much worse.  Avoids squatting, kneeling. If walks for a long time will get general ache.       OBJECTIVE:                  TODAY     Knee AROM:  Extension   WFL      Flexion    115  Knee PROM  Extension   WFL      Flexion    120     Joint Mobility: decreased internal tibial rotation    Gait: Ambulates with right lower leg in ext rotation- foot turned out    TREATMENT PROVIDED:  -Manual Therapy:  , patellar inf / sup glides , internal tibial rotation mobs / mobs with movement  -Therapeutic Exercise:  Bike x 8 mins ,QS 3 x 10 with 5 sec hold with ankle propped on roll. ,  SLR 3 x 10 flexion and abduction , bridges x 30 , tailgaters x 3 mins , standing hip abd and ext with green band 3 x 10 bilaterally , step ups with right x 30 , step down/ heel taps with no step- x 20  , TKE with green band 4 x 10 , bilateral hip ER with band 3 x 10 with hold, shuttle squats - 4 bands   -Modalities: IFC/ MH  -Education on condition and HEP      ASSESSMENT:  The patient is a 26 y.o. year old female who presented to physical therapy with complaints of patellar crepitus and knee aching following patellar subluxation and condyle fracture.    Progressing well with good effort for all . " Tolerated new exercises well.     Short Term Goals:    1. Pt will report a subjective decrease in pain.    MET  2. Pt will be instructed in home exercise program / self care  MET  3. Pt to ambulate household distances without pain  MET    Long Term Goals:  1. Full knee extension and flexion without pain    MET  2. Pt able to ambulate community distances with little to no pain.  3. Quadricep strength 5/5 without pain  4. Pt able to squat to pick object off floor without pain/ crepitus.       PLAN:  Patient will benefit from continuing (2-3) x/week for (3-4) weeks including manual therapy, therapeutic exercise, functional activities, modalities, and patient education.    Thank you for this referral.    These services are reasonable and necessary for the conditions set forth above while under my care.

## 2017-03-29 ENCOUNTER — CLINICAL SUPPORT (OUTPATIENT)
Dept: REHABILITATION | Facility: HOSPITAL | Age: 27
End: 2017-03-29
Attending: ORTHOPAEDIC SURGERY
Payer: COMMERCIAL

## 2017-03-29 DIAGNOSIS — M25.561 RIGHT KNEE PAIN, UNSPECIFIED CHRONICITY: Primary | ICD-10-CM

## 2017-03-29 PROCEDURE — 97110 THERAPEUTIC EXERCISES: CPT

## 2017-03-29 PROCEDURE — 97140 MANUAL THERAPY 1/> REGIONS: CPT

## 2017-03-29 PROCEDURE — 97014 ELECTRIC STIMULATION THERAPY: CPT

## 2017-03-29 NOTE — PROGRESS NOTES
PHYSICAL THERAPY OUTPATIENT VISIT    Referring Provider:  Dr. Noé Isabel    Diagnosis:       ICD-10-CM ICD-9-CM    1. Right knee pain, unspecified chronicity M25.561 719.46         Orders:  Evaluate and Treat  Date : 03/29/2017    Visit # 8    SUBJECTIVE:  Patient reports doing well. No pain at rest/ with sleep . Primarily feels weaker in that leg with activity and WB. When has pain it is primarily laterally      Date of Injury: 10/22/2016  Right Knee Injury from a fall. She suffered a lateral patellar dislocation with lateral femoral condyle fracture and medial retinacular tear. She has worn a hinged knee brace almost all the time. Is most concerned with crepitus in the knee but reports no significant pain otherwise. There is minimal swelling remaining- was much worse.  Avoids squatting, kneeling. If walks for a long time will get general ache.       OBJECTIVE:                  TODAY     Knee AROM:  Extension   WFL      Flexion    115  Knee PROM  Extension   WFL      Flexion    120     Joint Mobility: WFL internal tibial rotation    Strength: pain free quads and hams    TREATMENT PROVIDED:  -Manual Therapy:  , patellar inf / sup glides , internal tibial rotation mobs / mobs with movement, STM to distal quads and lateral knee- ITB to peroneals. Knee distraction mobs  -Therapeutic Exercise:  Bike x 8 mins ,QS 3 x 10 with 5 sec hold with ankle propped on roll. ,  SLR 3 x 10 flexion and abduction , bridges x 30 , tailgaters x 3 mins , standing hip abd and ext with green band 3 x 10 bilaterally , step ups with right x 30 , step down/ heel taps with no step- x 30  , TKE with green band 4 x 10 , bilateral hip ER with band 3 x 10 with hold, shuttle squats - 5 bands , single leg stance at rebounder 3 x 1 min   -Modalities: IFC/ MH  -Education on condition and HEP      ASSESSMENT:  The patient is a 26 y.o. year old female who presented to physical therapy with complaints of patellar crepitus and knee aching following  patellar subluxation and condyle fracture.    Doing well with full ROM and good effort / tolerance to all exercise. Progressions today with exercises - tolerated well.     Short Term Goals:    1. Pt will report a subjective decrease in pain.    MET  2. Pt will be instructed in home exercise program / self care  MET  3. Pt to ambulate household distances without pain  MET    Long Term Goals:  1. Full knee extension and flexion without pain    MET  2. Pt able to ambulate community distances with little to no pain.  3. Quadricep strength 5/5 without pain  4. Pt able to squat to pick object off floor without pain/ crepitus.       PLAN:  Patient will benefit from continuing (2-3) x/week for (2-3 weeks including manual therapy, therapeutic exercise, functional activities, modalities, and patient education.    Thank you for this referral.    These services are reasonable and necessary for the conditions set forth above while under my care.

## 2017-03-31 ENCOUNTER — CLINICAL SUPPORT (OUTPATIENT)
Dept: REHABILITATION | Facility: HOSPITAL | Age: 27
End: 2017-03-31
Attending: ORTHOPAEDIC SURGERY
Payer: COMMERCIAL

## 2017-03-31 DIAGNOSIS — M25.561 RIGHT KNEE PAIN, UNSPECIFIED CHRONICITY: Primary | ICD-10-CM

## 2017-03-31 PROCEDURE — 97014 ELECTRIC STIMULATION THERAPY: CPT

## 2017-03-31 PROCEDURE — 97110 THERAPEUTIC EXERCISES: CPT

## 2017-03-31 PROCEDURE — 97140 MANUAL THERAPY 1/> REGIONS: CPT

## 2017-03-31 NOTE — PROGRESS NOTES
"PHYSICAL THERAPY OUTPATIENT VISIT    Referring Provider:  Dr. Noé Isabel    Diagnosis:       ICD-10-CM ICD-9-CM    1. Right knee pain, unspecified chronicity M25.561 719.46         Orders:  Evaluate and Treat  Date : 03/31/2017    Visit # 9    SUBJECTIVE:  Patient reports no new complaints. States that she is doing well with no significnat pain. Just a feeling of "weakness" when walking at times- "kind of like it will give way a little".      Date of Injury: 10/22/2016  Right Knee Injury from a fall. She suffered a lateral patellar dislocation with lateral femoral condyle fracture and medial retinacular tear. She has worn a hinged knee brace almost all the time. Is most concerned with crepitus in the knee but reports no significant pain otherwise. There is minimal swelling remaining- was much worse.  Avoids squatting, kneeling. If walks for a long time will get general ache.       OBJECTIVE:                  TODAY     Knee AROM:  Extension   WFL      Flexion    115  Knee PROM  Extension   WFL      Flexion    120     Joint Mobility: WFL internal tibial rotation    Strength: pain free quads and hams    TREATMENT PROVIDED:  -Manual Therapy:  , patellar inf / sup glides , internal tibial rotation mobs / mobs with movement, STM to distal quads and lateral knee- ITB to peroneals.   -Therapeutic Exercise:  Bike x 8 mins ,QS 3 x 10 with 5 sec hold with ankle propped on roll. ,  SLR 3 x 10 all 4 directions , bridges x 30 with ER into band ( green)  , tailgaters x 3 mins , standing hip abd and ext with green band 3 x 10 bilaterally , step ups with right x 30 , step down/ heel taps with no step- x 30  , TKE with green band 4 x 10 , bilateral hip ER with band 3 x 10 with hold, shuttle squats - 5 bands , single leg stance at rebounder 3 x 1 min , shuttle squats with 4 bands x 4 mins   -Modalities: IFC/ MH  -Education on condition and HEP      ASSESSMENT:  The patient is a 26 y.o. year old female who presented to physical " therapy with complaints of patellar crepitus and knee aching following patellar subluxation and condyle fracture.    Performs all exercises well. Good effort with all treatment and able to perform exs well with little cueing needed.      Short Term Goals:    1. Pt will report a subjective decrease in pain.    MET  2. Pt will be instructed in home exercise program / self care  MET  3. Pt to ambulate household distances without pain  MET    Long Term Goals:  1. Full knee extension and flexion without pain    MET  2. Pt able to ambulate community distances with little to no pain.  3. Quadricep strength 5/5 without pain    4. Pt able to squat to pick object off floor without pain/ crepitus.       PLAN:  Patient will benefit from continuing (2-3) x/week for (2-3 weeks including manual therapy, therapeutic exercise, functional activities, modalities, and patient education.    Thank you for this referral.    These services are reasonable and necessary for the conditions set forth above while under my care.

## 2017-04-05 ENCOUNTER — OFFICE VISIT (OUTPATIENT)
Dept: ORTHOPEDICS | Facility: CLINIC | Age: 27
End: 2017-04-05
Payer: COMMERCIAL

## 2017-04-05 VITALS
BODY MASS INDEX: 40.18 KG/M2 | WEIGHT: 250 LBS | HEIGHT: 66 IN | HEART RATE: 88 BPM | SYSTOLIC BLOOD PRESSURE: 137 MMHG | DIASTOLIC BLOOD PRESSURE: 83 MMHG

## 2017-04-05 DIAGNOSIS — S83.001D: ICD-10-CM

## 2017-04-05 DIAGNOSIS — S83.004D PATELLAR DISLOCATION, RIGHT, SUBSEQUENT ENCOUNTER: Primary | ICD-10-CM

## 2017-04-05 DIAGNOSIS — S72.411D CLOSED DISPLACED FRACTURE OF CONDYLE OF RIGHT FEMUR WITH ROUTINE HEALING, SUBSEQUENT ENCOUNTER: ICD-10-CM

## 2017-04-05 DIAGNOSIS — S86.811D: ICD-10-CM

## 2017-04-05 DIAGNOSIS — S80.01XA CONTUSION OF KNEE, RIGHT: ICD-10-CM

## 2017-04-05 PROCEDURE — 99214 OFFICE O/P EST MOD 30 MIN: CPT | Mod: S$GLB,,, | Performed by: PHYSICIAN ASSISTANT

## 2017-04-05 PROCEDURE — 99999 PR PBB SHADOW E&M-EST. PATIENT-LVL III: CPT | Mod: PBBFAC,,, | Performed by: PHYSICIAN ASSISTANT

## 2017-04-05 PROCEDURE — 1160F RVW MEDS BY RX/DR IN RCRD: CPT | Mod: S$GLB,,, | Performed by: PHYSICIAN ASSISTANT

## 2017-04-05 NOTE — LETTER
April 5, 2017      Riri Monson MD  [address] [place] [address] [address] [place] [id number]           [facility]  [address] [place] [address] [address] [place] [address]  Phone: [phone]  Fax: [phone]          Patient: Lianne Mancia   MR Number: [id number]   YOB: 1990   Date of Visit: 4/5/2017       Dear Dr. Riri Monson:    Thank you for referring Lianne Mancia to me for evaluation. Attached you will find relevant portions of my assessment and plan of care.    If you have questions, please do not hesitate to call me. I look forward to following Lianne Mancia along with you.    Sincerely,    Dominic Mcgowan PA-C    Enclosure  CC:  No Recipients    If you would like to receive this communication electronically, please contact [email or web address] or [phone] to request more information on [facility] [employer] access.    For providers and/or their staff who would like to refer a patient to [facility], please contact us through our one-stop-shop provider referral line, [facility], at [phone].    If you feel you have received this communication in error or would no longer like to receive these types of communications, please e-mail [email or web address]

## 2017-04-05 NOTE — PROGRESS NOTES
Subjective:      Patient ID: Lianne Mancia is a 26 y.o. female.    Chief Complaint: Injury of the Right Knee    HPI Comments: Body part: Right Knee    Occupation: Sterile Processing Technician/ [employer]    Dominant hand: Right    Referred by: Riri Monson MD    Date of Injury: 10/22/2016    Problem Description: Right Knee Injury from a fall.  She suffered a lateral patellar dislocation with lateral femoral condyle fracture and medial retinacular tear.  There is diffuse bone bruising in the finger.  She has worn a hinged knee brace quite consistently.  She continues to have crepitus in the knee that she is concerned about.  No significant pain otherwise.  There is minimal swelling.    Since seeing me last, she has participate in physical therapy with only minimal improvement.  She has noticed no change in her strength subjectively.  She is now feeling more instability of the knee with giving way and occasional catching.  There is no new swelling.  There is no new foot drop.  She continues to have anterior knee pain, primarily.  There is continued crepitus.  She uses the Modic when necessary and ices intermittently through the week depending on discomfort.    Injury   The current episode started more than 1 month ago (10/22/2016). The problem occurs rarely. The problem has been gradually worsening. Pertinent negatives include no abdominal pain, chest pain, chills, congestion, coughing, fever, joint swelling, nausea, numbness, rash or vomiting. Nothing aggravates the symptoms. She has tried NSAIDs (knee brace, physical therapy) for the symptoms. The treatment provided no relief.       Review of Systems   Constitution: Negative for chills, fever and weight loss.   HENT: Negative for congestion and hearing loss.    Eyes: Negative for double vision and pain.   Cardiovascular: Negative for chest pain and irregular heartbeat.   Respiratory: Negative for cough and shortness of breath.    Endocrine:  Negative for polyuria.   Hematologic/Lymphatic: Does not bruise/bleed easily.   Skin: Negative for poor wound healing, rash and suspicious lesions.   Musculoskeletal: Negative for arthritis and joint swelling.   Gastrointestinal: Negative for abdominal pain, nausea and vomiting.   Genitourinary: Negative for bladder incontinence and frequency.   Neurological: Negative for loss of balance, numbness, paresthesias, sensory change and tremors.   Psychiatric/Behavioral: Positive for depression. The patient is nervous/anxious.    Allergic/Immunologic: Negative for hives.         Objective:            General    Nursing note and vitals reviewed.  Constitutional: She is oriented to person, place, and time. She appears well-developed and well-nourished. No distress.   Neurological: She is alert and oriented to person, place, and time.   Psychiatric: She has a normal mood and affect. Her behavior is normal. Judgment and thought content normal.     General Musculoskeletal Exam   Gait: normal       Right Knee Exam     Inspection   Scars: absent  Swelling: present  Effusion: present  Deformity: deformity  Bruising: absent    Tenderness   The patient is tender to palpation of the patella.    Crepitus   The patient has crepitus of the patella.    Range of Motion   The patient has normal right knee ROM.    Tests   Ligament Examination   MCL - Valgus: normal (0 to 2mm)  LCL - Varus: normal  Patella   Passive Patellar Tilt: lateral tilt  Patellar Tracking: abnormal  Patellar Grind: negative    Other   Sensation: normal    Comments:  Significant crepitus of the patella noted.    Left Knee Exam   Left knee exam is normal.    Range of Motion   The patient has normal left knee ROM.    Tests   Stability   MCL - Valgus: normal (0 to 2mm)  LCL - Varus: normal (0 to 2mm)  Patella   Passive Patellar Tilt: squinting  Patellar Grind: negative    Other   Sensation: normal    Muscle Strength   Right Lower Extremity   Hip Abduction: 4/5    Quadriceps:  4/5   Hamstrin/5   Left Lower Extremity   Hip Abduction: 4/5   Quadriceps:  4/5 and 5/5   Hamstrin/5 and 4/5     Vascular Exam       Edema  Right Lower Leg: absent  Left Lower Leg: absent              Assessment:       Encounter Diagnoses   Name Primary?    Patellar dislocation, right, subsequent encounter Yes    Contusion of knee, right     Subluxation of patella, right, subsequent encounter     Traumatic medial retinacular tear of knee, right, subsequent encounter     Closed displaced fracture of condyle of right femur with routine healing, subsequent encounter           Plan:       Lianne was seen today for injury.    Diagnoses and all orders for this visit:    Patellar dislocation, right, subsequent encounter  -     MRI Lower Extremity Joint WO Cont Right; Future    Contusion of knee, right  -     MRI Lower Extremity Joint WO Cont Right; Future    Subluxation of patella, right, subsequent encounter  -     MRI Lower Extremity Joint WO Cont Right; Future    Traumatic medial retinacular tear of knee, right, subsequent encounter  -     MRI Lower Extremity Joint WO Cont Right; Future    Closed displaced fracture of condyle of right femur with routine healing, subsequent encounter  -     MRI Lower Extremity Joint WO Cont Right; Future     proceed with MRI of the right knee to assess for further internal derangement.  We will use this to compare to her previous MRI to assess the degree of patellofemoral changes as well as the VMO/condylar interruption consistent with her prior patellar dislocation.  Depending on these findings, we may consider surgical referral versus continuation of conservative care.  I'll see her after the MRIs performed.  She has already participated in physical therapy as well as utilize extensive amounts of bracing.    The patient understands, chooses and consents to this plan and accepts all   the risks which include but are not limited to the risks mentioned above.    Pt understands the alternative of having no testing, intervention or       treatment at this time. Pt left content and without questions.     Disclaimer: This note was prepared using a voice recognition system and is likely to have sound alike errors within the text.

## 2017-04-10 ENCOUNTER — HOSPITAL ENCOUNTER (OUTPATIENT)
Dept: RADIOLOGY | Facility: HOSPITAL | Age: 27
Discharge: HOME | End: 2017-04-10
Attending: ORTHOPAEDIC SURGERY
Payer: COMMERCIAL

## 2017-04-10 DIAGNOSIS — S83.004D PATELLAR DISLOCATION, RIGHT, SUBSEQUENT ENCOUNTER: ICD-10-CM

## 2017-04-10 DIAGNOSIS — S80.01XA CONTUSION OF KNEE, RIGHT: ICD-10-CM

## 2017-04-10 DIAGNOSIS — S83.001D: ICD-10-CM

## 2017-04-10 DIAGNOSIS — S86.811D: ICD-10-CM

## 2017-04-10 DIAGNOSIS — S72.411D CLOSED DISPLACED FRACTURE OF CONDYLE OF RIGHT FEMUR WITH ROUTINE HEALING, SUBSEQUENT ENCOUNTER: ICD-10-CM

## 2017-04-10 PROCEDURE — 73721 MRI JNT OF LWR EXTRE W/O DYE: CPT | Mod: TC,RT

## 2017-04-13 ENCOUNTER — OFFICE VISIT (OUTPATIENT)
Dept: ORTHOPEDICS | Facility: CLINIC | Age: 27
End: 2017-04-13
Payer: COMMERCIAL

## 2017-04-13 VITALS
HEART RATE: 86 BPM | DIASTOLIC BLOOD PRESSURE: 86 MMHG | WEIGHT: 268.06 LBS | HEIGHT: 66 IN | SYSTOLIC BLOOD PRESSURE: 139 MMHG | BODY MASS INDEX: 43.08 KG/M2

## 2017-04-13 DIAGNOSIS — S72.411D CLOSED DISPLACED FRACTURE OF CONDYLE OF RIGHT FEMUR WITH ROUTINE HEALING, SUBSEQUENT ENCOUNTER: ICD-10-CM

## 2017-04-13 DIAGNOSIS — M95.8 OSTEOCHONDRAL DEFECT OF FEMORAL CONDYLE: Primary | ICD-10-CM

## 2017-04-13 DIAGNOSIS — S86.811D: ICD-10-CM

## 2017-04-13 DIAGNOSIS — S80.01XA CONTUSION OF KNEE, RIGHT: ICD-10-CM

## 2017-04-13 DIAGNOSIS — S83.004D PATELLAR DISLOCATION, RIGHT, SUBSEQUENT ENCOUNTER: ICD-10-CM

## 2017-04-13 PROCEDURE — 99999 PR PBB SHADOW E&M-EST. PATIENT-LVL III: CPT | Mod: PBBFAC,,, | Performed by: PHYSICIAN ASSISTANT

## 2017-04-13 PROCEDURE — 1160F RVW MEDS BY RX/DR IN RCRD: CPT | Mod: S$GLB,,, | Performed by: PHYSICIAN ASSISTANT

## 2017-04-13 PROCEDURE — 99213 OFFICE O/P EST LOW 20 MIN: CPT | Mod: S$GLB,,, | Performed by: PHYSICIAN ASSISTANT

## 2017-04-13 NOTE — PROGRESS NOTES
Subjective:      Patient ID: Lianne Mancia is a 26 y.o. female.    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  She suffered a lateral patellar dislocation with lateral femoral condyle fracture and medial retinacular tear.  There is diffuse bone bruising in the finger.  She has worn a hinged knee brace quite consistently.  She continues to have crepitus in the knee that she is concerned about.  No significant pain otherwise.  There is minimal swelling.    Since seeing me last, she has been stable. Here to discuss MRI results for the above mentioned problem.       Pain   Pertinent negatives include no abdominal pain, chest pain, chills, congestion, coughing, fever, joint swelling, nausea, numbness, rash or vomiting.   Injury   The current episode started more than 1 month ago (10/22/2016). The problem occurs rarely. The problem has been gradually worsening. Pertinent negatives include no abdominal pain, chest pain, chills, congestion, coughing, fever, joint swelling, nausea, numbness, rash or vomiting. Nothing aggravates the symptoms. She has tried NSAIDs (knee brace, physical therapy) for the symptoms. The treatment provided no relief.       Review of Systems   Constitution: Negative for chills, fever and weight loss.   HENT: Negative for congestion and hearing loss.    Eyes: Negative for double vision and pain.   Cardiovascular: Negative for chest pain and irregular heartbeat.   Respiratory: Negative for cough and shortness of breath.    Endocrine: Negative for polyuria.   Hematologic/Lymphatic: Does not bruise/bleed easily.   Skin: Negative for poor wound healing, rash and suspicious lesions.   Musculoskeletal: Negative for arthritis and joint  swelling.   Gastrointestinal: Negative for abdominal pain, nausea and vomiting.   Genitourinary: Negative for bladder incontinence and frequency.   Neurological: Negative for loss of balance, numbness, paresthesias, sensory change and tremors.   Psychiatric/Behavioral: Positive for depression. The patient is nervous/anxious.    Allergic/Immunologic: Negative for hives.         Objective:        No formal exam completed today. Vital signs and nurse note reviewed. See previous note for examination findings.       General    Nursing note and vitals reviewed.  Constitutional: She is oriented to person, place, and time. She appears well-developed and well-nourished. No distress.   Neurological: She is alert and oriented to person, place, and time.   Psychiatric: She has a normal mood and affect. Her behavior is normal. Judgment and thought content normal.             I have reviewed the films and report. I agree with the radiologist interpretation of the radiographic findings:  1.  Large osteochondral lesion in the weightbearing aspect of the lateral femoral condyle measuring 2.0 x 1.6 cm a single tiny underlying 2 mm cysts but no fluid signal intensity undercutting the lesion.  There is marked bone marrow edema underlying this osteochondral lesion in the lateral femoral condyle.  2.  Lateral patellar dislocation relocation injury with a medial patellar retinaculum tear, avulsed articular cartilage fragment from the patella measuring 8 x 7 mm located medial to the medial facet of the patella, and a nondisplaced chip fracture of the lateral aspect of the lateral femoral condyle measuring 1.2 x 0.5 x 1.3 cm.  3.  Small joint effusion.          Assessment:       Encounter Diagnoses   Name Primary?    Patellar dislocation, right, subsequent encounter     Contusion of knee, right     Traumatic medial retinacular tear of knee, right, subsequent encounter     Closed displaced fracture of condyle of right femur with routine  healing, subsequent encounter     Osteochondral defect of femoral condyle Yes          Plan:       Lianne was seen today for pain and injury.    Diagnoses and all orders for this visit:    Osteochondral defect of femoral condyle    Patellar dislocation, right, subsequent encounter    Contusion of knee, right    Traumatic medial retinacular tear of knee, right, subsequent encounter    Closed displaced fracture of condyle of right femur with routine healing, subsequent encounter     She has multiple findings within the knee that demonstrate the probable need for surgical intervention.  She will be evaluated for  arthroscopic versus open surgical procedure.  She will be supplied her copy of her MRI films and report for her records.  She will see me as needed.  She had no questions today.    The patient understands, chooses and consents to this plan and accepts all   the risks which include but are not limited to the risks mentioned above.   Pt understands the alternative of having no testing, intervention or       treatment at this time. Pt left content and without questions.     Disclaimer: This note was prepared using a voice recognition system and is likely to have sound alike errors within the text.

## 2017-06-19 ENCOUNTER — OFFICE VISIT (OUTPATIENT)
Dept: ENDOCRINOLOGY | Facility: CLINIC | Age: 27
End: 2017-06-19
Payer: COMMERCIAL

## 2017-06-19 VITALS
WEIGHT: 269 LBS | DIASTOLIC BLOOD PRESSURE: 84 MMHG | HEIGHT: 67 IN | SYSTOLIC BLOOD PRESSURE: 122 MMHG | BODY MASS INDEX: 42.22 KG/M2 | HEART RATE: 90 BPM

## 2017-06-19 DIAGNOSIS — E78.6 LOW HDL (UNDER 40): ICD-10-CM

## 2017-06-19 DIAGNOSIS — E66.01 MORBID OBESITY DUE TO EXCESS CALORIES: ICD-10-CM

## 2017-06-19 DIAGNOSIS — E55.9 VITAMIN D DEFICIENCY: ICD-10-CM

## 2017-06-19 PROCEDURE — 99999 PR PBB SHADOW E&M-EST. PATIENT-LVL III: CPT | Mod: PBBFAC,,, | Performed by: INTERNAL MEDICINE

## 2017-06-19 PROCEDURE — 99214 OFFICE O/P EST MOD 30 MIN: CPT | Mod: S$GLB,,, | Performed by: INTERNAL MEDICINE

## 2017-06-19 RX ORDER — TESTOSTERONE CYPIONATE 200 MG/ML
100 INJECTION, SOLUTION INTRAMUSCULAR
Qty: 10 ML | Refills: 5 | Status: SHIPPED | OUTPATIENT
Start: 2017-06-19 | End: 2017-12-09

## 2017-06-19 NOTE — PROGRESS NOTES
Subjective:      Patient ID: Lianne Mancia is a 26 y.o. female.    Chief Complaint:  Other (Gender dysphoria)      History of Present Illness  Here for his  trans care     First identified as a man in        started cross hormone therapy 9/20/16  trt dose  100 mg q  Week - fridays    No menses     He is pleased with the changes so far  No concerns   He is self injecting            Therapist was Benny Fraga   Letter was provided attesting to readiness for TRT         Relationships women - online relationship  - was supposed to see her in April in [place] but was not able and is planning a trip in oct - she is not aware but he feels she would be supportive         Friends supportive   Mother not supportive but she aware (she does not know he is on trt) - he lives with her     his sister is supportive    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  He uses female lockers and restrooms   He believes they will cover trans surgery - he is interested in mastectomy           No recent menses  - lmp 2015 before provera         tob - no  etoh - no   Drugs - no      With regards to morbid  Obesity Denies symptoms of hyperglycemia such as polyuria, polydipsia, nocturia, unexplained weight loss or blurred vision     Knee injury in October has limited ability to exercise - awaiting scope     With regards to the vitamin d deficiency:  currently taking otc 2000 iu a day     With regards to the dyslipidemia (low hdl)   He is exercising  Has a gym membership - lost 4 lbs     Review of Systems   Constitutional: Negative for unexpected weight change.   Eyes: Negative for visual disturbance.   Respiratory: Negative for shortness of breath.    Cardiovascular: Negative for chest pain.   Gastrointestinal: Negative for abdominal pain.   Musculoskeletal: Negative for arthralgias.   Skin: Negative for wound.   Neurological: Negative for headaches.   Hematological: Does not bruise/bleed easily.   Psychiatric/Behavioral: Negative  for sleep disturbance.       Objective:   Physical Exam   Neck: No thyromegaly present.   Cardiovascular: Normal rate.    Pulmonary/Chest: Effort normal.   Abdominal: Soft.   Musculoskeletal: She exhibits no edema.   Vitals reviewed.  +acanthosis and skin tags  No supraclavicular fulness  Pale thin striae  Normal proximal muscle strength  injection sites are ok.    Body mass index is 42.77 kg/m².    Lab Review:   Lab Results   Component Value Date    HGBA1C 5.2 01/03/2017     Lab Results   Component Value Date    CHOL 182 01/03/2017    HDL 31 (L) 01/03/2017    LDLCALC 115.8 01/03/2017    TRIG 176 (H) 01/03/2017    CHOLHDL 17.0 (L) 01/03/2017     Lab Results   Component Value Date     01/03/2017    K 4.1 01/03/2017     01/03/2017    CO2 24 01/03/2017    GLU 95 01/03/2017    BUN 8 01/03/2017    CREATININE 0.8 01/03/2017    CALCIUM 9.8 01/03/2017    PROT 8.1 01/03/2017    ALBUMIN 3.9 01/03/2017    BILITOT 0.2 01/03/2017    ALKPHOS 74 01/03/2017    AST 15 01/03/2017    ALT 11 01/03/2017    ANIONGAP 10 01/03/2017    ESTGFRAFRICA >60.0 01/03/2017    EGFRNONAA >60.0 01/03/2017    TSH 2.242 01/03/2017       Assessment and Plan     Endocrine disorder in female-to-male transgender person  Transman: gender incongruence   Reviewed therapy, side effects (both wanted and unwanted), possible adverse outcomes, expectations, compliance.  Reviewed limited data on fertility     Reviewed the need for contraception as testosterone is not a contraceptive if having vaginal sex with non-trans men       Will   continue Testosterone replacement therapy 100 mg q 1 week   Labs  today   RTC in 12  months with labs   Noting  Nl hh/ for men is:     Hemoglobin 14.0-18.0 g/dL    Hematocrit 40.0-54.0 %        Healthy lifestyle stressed  Discussed the need for a Pap.   Urged to discuss with HR trans status       Morbid obesity due to excess calories   IGT - alerted as to the increased risk for t2DM  Stressed diet and exercise  Goal 5-7%  weight loss   May use metformin in the future   Periodic hba1c levels      Vitamin D deficiency  Start over the counter vitamin D 2000 iu a day      Low HDL (under 40)  Continue to work on diet, exercise and weight loss

## 2017-06-19 NOTE — ASSESSMENT & PLAN NOTE
Transman: gender incongruence   Reviewed therapy, side effects (both wanted and unwanted), possible adverse outcomes, expectations, compliance.  Reviewed limited data on fertility     Reviewed the need for contraception as testosterone is not a contraceptive if having vaginal sex with non-trans men       Will   continue Testosterone replacement therapy 100 mg q 1 week   Labs  today   RTC in 12  months with labs   Noting  Nl hh/ for men is:     Hemoglobin 14.0-18.0 g/dL    Hematocrit 40.0-54.0 %        Healthy lifestyle stressed  Discussed the need for a Pap.   Urged to discuss with HR trans status

## 2017-06-19 NOTE — ASSESSMENT & PLAN NOTE
IGT - alerted as to the increased risk for t2DM  Stressed diet and exercise  Goal 5-7% weight loss   May use metformin in the future   Periodic hba1c levels

## 2017-12-09 ENCOUNTER — OFFICE VISIT (OUTPATIENT)
Dept: URGENT CARE | Facility: CLINIC | Age: 27
End: 2017-12-09
Payer: COMMERCIAL

## 2017-12-09 VITALS
HEIGHT: 67 IN | SYSTOLIC BLOOD PRESSURE: 146 MMHG | BODY MASS INDEX: 39.87 KG/M2 | DIASTOLIC BLOOD PRESSURE: 84 MMHG | TEMPERATURE: 99 F | HEART RATE: 80 BPM | WEIGHT: 254 LBS

## 2017-12-09 DIAGNOSIS — H61.22 EXCESSIVE EAR WAX, LEFT: Primary | ICD-10-CM

## 2017-12-09 PROCEDURE — 99213 OFFICE O/P EST LOW 20 MIN: CPT | Mod: S$GLB,,, | Performed by: INTERNAL MEDICINE

## 2017-12-09 PROCEDURE — 99999 PR PBB SHADOW E&M-EST. PATIENT-LVL III: CPT | Mod: PBBFAC,,, | Performed by: INTERNAL MEDICINE

## 2017-12-09 NOTE — PROGRESS NOTES
"HPI:  Pt comes to Sat clinic with c/o left ear full of wax.     Current meds have been verified and updated per the EMR  Exam:BP (!) 146/84 (BP Location: Right arm, Patient Position: Sitting)   Pulse 80   Temp 98.6 °F (37 °C) (Tympanic)   Ht 5' 6.5" (1.689 m)   Wt 115.2 kg (253 lb 15.5 oz)   BMI 40.38 kg/m²   Left EAC occluded with cerumen        Impression:  Ceruminosis    Patient Active Problem List   Diagnosis    Anemia    Morbid obesity due to excess calories    Endocrine disorder in female-to-male transgender person    Vitamin D deficiency    Low HDL (under 40)       Plan:   PT instructed on how to irrigate EAC with diluted H2O2      "

## 2017-12-24 RX ORDER — TESTOSTERONE CYPIONATE 200 MG/ML
INJECTION, SOLUTION INTRAMUSCULAR
Qty: 10 ML | Refills: 5 | Status: SHIPPED | OUTPATIENT
Start: 2017-12-24 | End: 2018-05-10

## 2018-05-10 ENCOUNTER — OFFICE VISIT (OUTPATIENT)
Dept: ENDOCRINOLOGY | Facility: CLINIC | Age: 28
End: 2018-05-10
Payer: COMMERCIAL

## 2018-05-10 ENCOUNTER — LAB VISIT (OUTPATIENT)
Dept: LAB | Facility: HOSPITAL | Age: 28
End: 2018-05-10
Attending: INTERNAL MEDICINE
Payer: COMMERCIAL

## 2018-05-10 VITALS
HEIGHT: 67 IN | WEIGHT: 251.75 LBS | SYSTOLIC BLOOD PRESSURE: 132 MMHG | DIASTOLIC BLOOD PRESSURE: 88 MMHG | BODY MASS INDEX: 39.51 KG/M2 | HEART RATE: 86 BPM

## 2018-05-10 DIAGNOSIS — E66.01 MORBID OBESITY DUE TO EXCESS CALORIES: ICD-10-CM

## 2018-05-10 DIAGNOSIS — E78.6 LOW HDL (UNDER 40): ICD-10-CM

## 2018-05-10 DIAGNOSIS — E55.9 VITAMIN D DEFICIENCY: ICD-10-CM

## 2018-05-10 LAB
25(OH)D3+25(OH)D2 SERPL-MCNC: 7 NG/ML
ALBUMIN SERPL BCP-MCNC: 4.4 G/DL
ALP SERPL-CCNC: 78 U/L
ALT SERPL W/O P-5'-P-CCNC: 18 U/L
ANION GAP SERPL CALC-SCNC: 8 MMOL/L
AST SERPL-CCNC: 20 U/L
BASOPHILS # BLD AUTO: 0.03 K/UL
BASOPHILS NFR BLD: 0.6 %
BILIRUB SERPL-MCNC: 0.5 MG/DL
BUN SERPL-MCNC: 5 MG/DL
CALCIUM SERPL-MCNC: 10 MG/DL
CHLORIDE SERPL-SCNC: 104 MMOL/L
CHOLEST SERPL-MCNC: 182 MG/DL
CHOLEST/HDLC SERPL: 5.5 {RATIO}
CO2 SERPL-SCNC: 30 MMOL/L
CREAT SERPL-MCNC: 0.9 MG/DL
DIFFERENTIAL METHOD: NORMAL
EOSINOPHIL # BLD AUTO: 0.1 K/UL
EOSINOPHIL NFR BLD: 1.7 %
ERYTHROCYTE [DISTWIDTH] IN BLOOD BY AUTOMATED COUNT: 12.7 %
EST. GFR  (AFRICAN AMERICAN): >60 ML/MIN/1.73 M^2
EST. GFR  (NON AFRICAN AMERICAN): >60 ML/MIN/1.73 M^2
ESTIMATED AVG GLUCOSE: 100 MG/DL
GLUCOSE SERPL-MCNC: 93 MG/DL
HBA1C MFR BLD HPLC: 5.1 %
HCT VFR BLD AUTO: 47.4 %
HDLC SERPL-MCNC: 33 MG/DL
HDLC SERPL: 18.1 %
HGB BLD-MCNC: 15.4 G/DL
IMM GRANULOCYTES # BLD AUTO: 0.02 K/UL
IMM GRANULOCYTES NFR BLD AUTO: 0.4 %
LDLC SERPL CALC-MCNC: 131.8 MG/DL
LYMPHOCYTES # BLD AUTO: 2.2 K/UL
LYMPHOCYTES NFR BLD: 41.2 %
MCH RBC QN AUTO: 30.8 PG
MCHC RBC AUTO-ENTMCNC: 32.5 G/DL
MCV RBC AUTO: 95 FL
MONOCYTES # BLD AUTO: 0.5 K/UL
MONOCYTES NFR BLD: 10.3 %
NEUTROPHILS # BLD AUTO: 2.4 K/UL
NEUTROPHILS NFR BLD: 45.8 %
NONHDLC SERPL-MCNC: 149 MG/DL
NRBC BLD-RTO: 0 /100 WBC
PLATELET # BLD AUTO: 266 K/UL
PMV BLD AUTO: 11.9 FL
POTASSIUM SERPL-SCNC: 3.8 MMOL/L
PROT SERPL-MCNC: 8.3 G/DL
RBC # BLD AUTO: 5 M/UL
SODIUM SERPL-SCNC: 142 MMOL/L
TESTOST SERPL-MCNC: 370 NG/DL
TRIGL SERPL-MCNC: 86 MG/DL
TSH SERPL DL<=0.005 MIU/L-ACNC: 1.99 UIU/ML
WBC # BLD AUTO: 5.24 K/UL

## 2018-05-10 PROCEDURE — 82306 VITAMIN D 25 HYDROXY: CPT

## 2018-05-10 PROCEDURE — 80061 LIPID PANEL: CPT

## 2018-05-10 PROCEDURE — 83036 HEMOGLOBIN GLYCOSYLATED A1C: CPT

## 2018-05-10 PROCEDURE — 80053 COMPREHEN METABOLIC PANEL: CPT

## 2018-05-10 PROCEDURE — 99214 OFFICE O/P EST MOD 30 MIN: CPT | Mod: S$GLB,,, | Performed by: INTERNAL MEDICINE

## 2018-05-10 PROCEDURE — 85025 COMPLETE CBC W/AUTO DIFF WBC: CPT

## 2018-05-10 PROCEDURE — 99999 PR PBB SHADOW E&M-EST. PATIENT-LVL III: CPT | Mod: PBBFAC,,, | Performed by: INTERNAL MEDICINE

## 2018-05-10 PROCEDURE — 84443 ASSAY THYROID STIM HORMONE: CPT

## 2018-05-10 PROCEDURE — 84403 ASSAY OF TOTAL TESTOSTERONE: CPT

## 2018-05-10 PROCEDURE — 36415 COLL VENOUS BLD VENIPUNCTURE: CPT

## 2018-05-10 RX ORDER — ACETAMINOPHEN 500 MG
1 TABLET ORAL DAILY
Start: 2018-05-10

## 2018-05-10 RX ORDER — TESTOSTERONE CYPIONATE 200 MG/ML
200 INJECTION, SOLUTION INTRAMUSCULAR
Qty: 10 ML | Refills: 5 | Status: SHIPPED | OUTPATIENT
Start: 2018-05-10 | End: 2018-11-24 | Stop reason: SDUPTHER

## 2018-05-10 NOTE — PROGRESS NOTES
Subjective:      Patient ID: Lianne Mancia is a 27 y.o. female.    Chief Complaint:  Gender dysphoria and Vitamin D Deficiency      History of Present Illness  Here for his  trans care     First identified as a man in HS       started cross hormone therapy 9/20/16  trt dose  100 mg q  Week - fridays    No menses      He is pleased with the changes so far  No concerns   He is self injecting            Therapist was Benny Fraga   Letter was provided attesting to readiness for TRT         Relationships with women -single currently          Friends supportive   Mother not supportive but she aware (she does not know he is on trt) - he lives with her  Planning on telling his dad soon       his sister is very supportive    Working at [employer] - he is planning on telling them soon   He uses female lockers and restrooms   He believes they will cover trans surgery - he is interested in mastectomy           No recent menses  - lmp 2015 before provera         tob - no  etoh - no   Drugs - no      With regards to morbid  Obesity Denies symptoms of hyperglycemia such as polyuria, polydipsia, nocturia, unexplained weight loss or blurred vision       With regards to the vitamin d deficiency:  Currently not taking otc 2000 iu a day     With regards to the dyslipidemia (low hdl)   He is exercising and working on bulking up           Review of Systems   Constitutional: Negative for unexpected weight change.   Eyes: Negative for visual disturbance.   Respiratory: Negative for shortness of breath.    Cardiovascular: Negative for chest pain.   Gastrointestinal: Negative for abdominal pain.   Musculoskeletal: Negative for arthralgias.   Skin: Negative for wound.   Neurological: Negative for headaches.   Hematological: Does not bruise/bleed easily.   Psychiatric/Behavioral: Negative for sleep disturbance.       Objective:   Physical Exam   Neck: No thyromegaly present.   Cardiovascular: Normal rate.    Pulmonary/Chest:  Effort normal.   Abdominal: Soft.   Musculoskeletal: She exhibits no edema.   Vitals reviewed.      Body mass index is 40.03 kg/m².    Lab Review:   Lab Results   Component Value Date    HGBA1C 5.1 05/10/2018     Lab Results   Component Value Date    CHOL 182 05/10/2018    HDL 33 (L) 05/10/2018    LDLCALC 131.8 05/10/2018    TRIG 86 05/10/2018    CHOLHDL 18.1 (L) 05/10/2018     Lab Results   Component Value Date     05/10/2018    K 3.8 05/10/2018     05/10/2018    CO2 30 (H) 05/10/2018    GLU 93 05/10/2018    BUN 5 (L) 05/10/2018    CREATININE 0.9 05/10/2018    CALCIUM 10.0 05/10/2018    PROT 8.3 05/10/2018    ALBUMIN 4.4 05/10/2018    BILITOT 0.5 05/10/2018    ALKPHOS 78 05/10/2018    AST 20 05/10/2018    ALT 18 05/10/2018    ANIONGAP 8 05/10/2018    ESTGFRAFRICA >60.0 05/10/2018    EGFRNONAA >60.0 05/10/2018    TSH 1.991 05/10/2018       Assessment and Plan     Endocrine disorder in female-to-male transgender person  Transman: gender incongruence   Reviewed therapy, side effects (both wanted and unwanted), possible adverse outcomes, expectations, compliance.  Reviewed limited data on fertility     Reviewed the need for contraception as testosterone is not a contraceptive if having vaginal sex with non-trans men       Will   continue Testosterone replacement therapy 100 mg q 1 week   Labs  today   RTC in 12  months with labs   Noting  Nl hh/ for men is:     Hemoglobin 14.0-18.0 g/dL    Hematocrit 40.0-54.0 %        Healthy lifestyle stressed       Low HDL (under 40)  Continue to work on diet, exercise and weight loss     Vitamin D deficiency  Start over the counter vitamin D 2000 iu a day      Morbid obesity due to excess calories   IGT - alerted as to the increased risk for t2DM  Stressed diet and exercise  Goal 5-7% weight loss   May use metformin in the future   Periodic hba1c levels

## 2018-05-15 NOTE — ASSESSMENT & PLAN NOTE
Transman: gender incongruence   Reviewed therapy, side effects (both wanted and unwanted), possible adverse outcomes, expectations, compliance.  Reviewed limited data on fertility     Reviewed the need for contraception as testosterone is not a contraceptive if having vaginal sex with non-trans men       Will   continue Testosterone replacement therapy 100 mg q 1 week   Labs  today   RTC in 12  months with labs   Noting  Nl hh/ for men is:     Hemoglobin 14.0-18.0 g/dL    Hematocrit 40.0-54.0 %        Healthy lifestyle stressed

## 2018-09-05 ENCOUNTER — OFFICE VISIT (OUTPATIENT)
Dept: INTERNAL MEDICINE | Facility: CLINIC | Age: 28
End: 2018-09-05
Payer: COMMERCIAL

## 2018-09-05 VITALS
OXYGEN SATURATION: 98 % | TEMPERATURE: 98 F | BODY MASS INDEX: 41.36 KG/M2 | DIASTOLIC BLOOD PRESSURE: 100 MMHG | SYSTOLIC BLOOD PRESSURE: 138 MMHG | WEIGHT: 260.13 LBS | HEART RATE: 85 BPM

## 2018-09-05 DIAGNOSIS — E66.01 MORBID OBESITY DUE TO EXCESS CALORIES: ICD-10-CM

## 2018-09-05 DIAGNOSIS — I10 ESSENTIAL HYPERTENSION: ICD-10-CM

## 2018-09-05 DIAGNOSIS — L42 PITYRIASIS ROSEA: Primary | ICD-10-CM

## 2018-09-05 PROCEDURE — 99999 PR PBB SHADOW E&M-EST. PATIENT-LVL III: CPT | Mod: PBBFAC,,, | Performed by: FAMILY MEDICINE

## 2018-09-05 PROCEDURE — 99214 OFFICE O/P EST MOD 30 MIN: CPT | Mod: S$GLB,,, | Performed by: FAMILY MEDICINE

## 2018-09-05 RX ORDER — TRIAMCINOLONE ACETONIDE 0.25 MG/G
CREAM TOPICAL 2 TIMES DAILY
Qty: 80 G | Refills: 0 | Status: SHIPPED | OUTPATIENT
Start: 2018-09-05 | End: 2019-07-24

## 2018-09-05 RX ORDER — SYRINGE WITH NEEDLE, 1 ML 25GX5/8"
SYRINGE, EMPTY DISPOSABLE MISCELLANEOUS
Refills: 5 | COMMUNITY
Start: 2018-08-12 | End: 2019-10-07 | Stop reason: SDUPTHER

## 2018-09-05 RX ORDER — AMLODIPINE BESYLATE 5 MG/1
5 TABLET ORAL DAILY
Qty: 30 TABLET | Refills: 1 | Status: SHIPPED | OUTPATIENT
Start: 2018-09-05 | End: 2018-10-04 | Stop reason: SDUPTHER

## 2018-09-05 NOTE — PROGRESS NOTES
Subjective:       Patient ID: Lianne Mancia is a 28 y.o. female.    Chief Complaint: Rash (on neck going to back about a week 1/2)     Patient presents to clinic today reporting a rash on his neck for the last week and a half.  He reports he thought it might have been a metal reaction or impression from and necklace.  He reports itching associated with it and has noted similar symptoms on upper arms.  Patient reports normal blood pressure at last visit with Dr. Rich in May.  Patient denies headache, vision change, chest pain, shortness of breath or tingling/ numbness / weakness.  Patient is otherwise without concerns today.      Review of Systems   Constitutional: Negative for chills, fatigue, fever and unexpected weight change.   Eyes: Negative for visual disturbance.   Respiratory: Negative for shortness of breath.    Cardiovascular: Negative for chest pain.   Musculoskeletal: Negative for myalgias.   Skin: Positive for rash.   Neurological: Negative for headaches.       Objective:      Physical Exam   Constitutional: She is oriented to person, place, and time. She appears well-developed and well-nourished. No distress.   HENT:   Head: Normocephalic and atraumatic.   Eyes: Conjunctivae and EOM are normal. Pupils are equal, round, and reactive to light. No scleral icterus.   Cardiovascular: Normal rate and regular rhythm. Exam reveals no gallop and no friction rub.   No murmur heard.  Pulmonary/Chest: Effort normal and breath sounds normal.   Neurological: She is alert and oriented to person, place, and time. No cranial nerve deficit. Gait normal.   Skin:   Rash noted on neck, upper back and bilateral upper extremities consistent with pityriasis rosea   Psychiatric: She has a normal mood and affect.   Vitals reviewed.      Assessment:       1. Pityriasis rosea    2. Essential hypertension    3. Morbid obesity due to excess calories        Plan:     Problem List Items Addressed This Visit     Morbid  obesity due to excess calories    Current Assessment & Plan      Advised weight loss through diet and regular exercise         Pityriasis rosea - Primary    Current Assessment & Plan       Advised patient this is self-limited, will treat with Kenalog cream for itching         Relevant Medications    triamcinolone acetonide 0.025% (KENALOG) 0.025 % cream    Essential hypertension    Current Assessment & Plan      Positive family history of hypertension, will start Norvasc 5 mg daily and reassess in 1 month         Relevant Medications    amLODIPine (NORVASC) 5 MG tablet

## 2018-09-05 NOTE — PATIENT INSTRUCTIONS
Pityriasis Rosea  This is a harmless non-contagious rash. The exact cause is unknown. It is not an allergic reaction, and does not seem to be caused by a viral or fungal infection. Although anyone can get it, it is most often seen in children and young adults ages 10 to 35.  Pityriasis usually resolves on its own without treatment in 2 weeks.  In some people it may take 6 to 8 weeks to clear up.   Home care  · For dry skin, use a moisturizing cream. For itchiness, use 1% hydrocortisone cream (no prescription needed) or calamine lotion 2 to 3 times a day.  · Exposure to natural sunlight helps some people. It may help fade the rash, but doesn't seem to help the itching. Don't overdo it in the sun, as your skin may be more sensitive than usual. You dont want to burn yourself. Artificial sun lamps, sun beds, and tanning salons are not recommended.  · This condition is not contagious. Your child may attend  or school while the rash is present.  Medicines  Talk with your healthcare provider before using any medicines. All medicines have side effects.  · Medicines will not get rid of the rash.   · Moisturizing skin creams may help.  · Antihistamines may help with itching, but they can make you sleepy.  · Topical steroids are sometimes used.  Follow-up care  Follow up with your healthcare provider, or as advised. Call your provider if the itching is not controlled by the above suggestions, or if the rash lasts longer than 3 months.  When to seek medical advice  Call your healthcare provider right away if any of these occur:  · You develop a rash and are pregnant  · Severe itching  · Signs of infection in the skin (increasing redness, drainage of pus, yellow-brown scabs)  · Fever or other symptoms of a new illness  Date Last Reviewed: 8/1/2016  © 0462-0218 [employer]. [address] [place] [address], [place] [address]. All rights reserved. This information is not intended as a substitute for professional  medical care. Always follow your healthcare professional's instructions.      Discharge Instructions: Taking Your Blood Pressure  Blood pressure is the force of blood as it moves from the heart through the blood vessels. You can take your own blood pressure reading using a digital monitor. Take readings as often as your healthcare provider instructs. Take your readings each time in the same way, using the same arm. Here are guidelines for taking your blood pressure.  The American Heart Association (AHA) recommends purchasing a blood pressure monitor that is validated and approved by the Association for the Advancement of Medical Instrumentation, the [place] Hypertension Society, and the International Protocol for the Validation of Automated BP Measuring Devices. If the blood pressure monitor is for a senior adult, a pregnant woman, or a child, make certain it is validated for use with such a population. For the most reliable readings, the AHA recommends an automatic, cuff-style, upper arm (bicep) monitor. The readings from finger and wrist monitors are not as reliable as the upper arm monitor.        Step 1. Relax    · Wait at least a half hour after smoking, eating, or exercising. Do not drink coffee, tea, soda, or other caffeinated beverages before checking your blood pressure.   · Sit comfortably at a table. Place the monitor near you.  · Rest for a few minutes before you begin.        Step 2. Wrap the cuff    · Place your arm on the table, palm up. Put your arm in a position that is level with your heart. Wrap the cuff around your upper arm, about an inch above your elbow. Its best to wrap the cuff on bare skin, not over clothing.  · Make sure your cuff fits. If it doesnt wrap around your upper arm, order a larger cuff. A cuff that is too large or too small can result in an inaccurate blood pressure reading.           Step 3. Inflate the cuff    · Pump the cuff until the scale reads 200. If you have a  self-inflating cuff, push the button that starts the pump.  · The cuff will tighten, then loosen.  · The numbers will change. When they stop changing, your blood pressure reading will appear.  · If you get a reading that is too high or too low for you, relax for a few minutes. Then do the test again.    Step 4. Write down the results  · Write down your blood pressure numbers. Jono the date and time. Keep your results in one place, such as a notebook.  · Remove the cuff from your arm. Turn off the machine.  · Take the readings with you to your medical appointments.  · If you start a new blood pressure medicine, or change a blood pressure medicine dose, note the day you started the new drug or dosage on your blood pressure recording sheet. This will help your healthcare provider monitor the effect of medication changes.     Date Last Reviewed: 4/27/2016  © 3581-2046 [employer]. [address] [place] [address]. All rights reserved. This information is not intended as a substitute for professional medical care. Always follow your healthcare professional's instructions.        Controlling High Blood Pressure  High blood pressure (hypertension) is often called the silent killer. This is because many people who have it dont know it. High blood pressure is defined as 140/90 mm Hg or higher. Know your blood pressure and remember to check it regularly. Doing so can save your life. Here are some things you can do to help control your blood pressure.    Choose heart-healthy foods  · Select low-salt, low-fat foods. Limit sodium intake to 2,400 mg per day or the amount suggested by your healthcare provider.  · Limit canned, dried, cured, packaged, and fast foods. These can contain a lot of salt.  · Eat 8 to 10 servings of fruits and vegetables every day.  · Choose lean meats, fish, or chicken.  · Eat whole-grain pasta, brown rice, and beans.  · Eat 2 to 3 servings of low-fat or fat-free dairy  products.  · Ask your doctor about the DASH eating plan. This plan helps reduce blood pressure.  · When you go to a restaurant, ask that your meal be prepared with no added salt.  Maintain a healthy weight  · Ask your healthcare provider how many calories to eat a day. Then stick to that number.  · Ask your healthcare provider what weight range is healthiest for you. If you are overweight, a weight loss of only 3% to 5% of your body weight can help lower blood pressure. Generally, a good weight loss goal is to lose 10% of your body weight in a year.  · Limit snacks and sweets.  · Get regular exercise.  Get up and get active  · Choose activities you enjoy. Find ones you can do with friends or family. This includes bicycling, dancing, walking, and jogging.  · Park farther away from building entrances.  · Use stairs instead of the elevator.  · When you can, walk or bike instead of driving.  · [place] leaves, garden, or do household repairs.  · Be active at a moderate to vigorous level of physical activity for at least 40 minutes for a minimum of 3 to 4 days a week.   Manage stress  · Make time to relax and enjoy life. Find time to laugh.  · Communicate your concerns with your loved ones and your healthcare provider.  · Visit with family and friends, and keep up with hobbies.  Limit alcohol and quit smoking  · Men should have no more than 2 drinks per day.  · Women should have no more than 1 drink per day.  · Talk with your healthcare provider about quitting smoking. Smoking significantly increases your risk for heart disease and stroke. Ask your healthcare provider about community smoking cessation programs and other options.  Medicines  If lifestyle changes arent enough, your healthcare provider may prescribe high blood pressure medicine. Take all medicines as prescribed. If you have any questions about your medicines, ask your healthcare provider before stopping or changing them.   Date Last Reviewed: 4/27/2016  ©  7518-2276 [employer]. [address] [place] [address], [place] [address]. All rights reserved. This information is not intended as a substitute for professional medical care. Always follow your healthcare professional's instructions.      Amlodipine tablets  What is this medicine?  AMLODIPINE (am LAINE jarvis) is a calcium-channel blocker. It affects the amount of calcium found in your heart and muscle cells. This relaxes your blood vessels, which can reduce the amount of work the heart has to do. This medicine is used to lower high blood pressure. It is also used to prevent chest pain.  How should I use this medicine?  Take this medicine by mouth with a glass of water. Follow the directions on the prescription label. Take your medicine at regular intervals. Do not take more medicine than directed.  Talk to your pediatrician regarding the use of this medicine in children. Special care may be needed. This medicine has been used in children as young as 6.  Persons over 65 years old may have a stronger reaction to this medicine and need smaller doses.  What side effects may I notice from receiving this medicine?  Side effects that you should report to your doctor or health care professional as soon as possible:  · allergic reactions like skin rash, itching or hives, swelling of the face, lips, or tongue  · breathing problems  · changes in vision or hearing  · chest pain  · fast, irregular heartbeat  · swelling of legs or ankles  Side effects that usually do not require medical attention (report to your doctor or health care professional if they continue or are bothersome):  · dry mouth  · facial flushing  · nausea, vomiting  · stomach gas, pain  · tired, weak  · trouble sleeping  What may interact with this medicine?  · herbal or dietary supplements  · local or general anesthetics  · medicines for high blood pressure  · medicines for prostate problems  · rifampin  What if I miss a dose?  If you miss a dose, take  it as soon as you can. If it is almost time for your next dose, take only that dose. Do not take double or extra doses.  Where should I keep my medicine?  Keep out of the reach of children.  Store at room temperature between 59 and 86 degrees F (15 and 30 degrees C). Protect from light. Keep container tightly closed. Throw away any unused medicine after the expiration date.  What should I tell my health care provider before I take this medicine?  They need to know if you have any of these conditions:  · heart problems like heart failure or aortic stenosis  · liver disease  · an unusual or allergic reaction to amlodipine, other medicines, foods, dyes, or preservatives  · pregnant or trying to get pregnant  · breast-feeding  What should I watch for while using this medicine?  Visit your doctor or health care professional for regular check ups. Check your blood pressure and pulse rate regularly. Ask your health care professional what your blood pressure and pulse rate should be, and when you should contact him or her.  This medicine may make you feel confused, dizzy or lightheaded. Do not drive, use machinery, or do anything that needs mental alertness until you know how this medicine affects you. To reduce the risk of dizzy or fainting spells, do not sit or stand up quickly, especially if you are an older patient. Avoid alcoholic drinks; they can make you more dizzy.  Do not suddenly stop taking amlodipine. Ask your doctor or health care professional how you can gradually reduce the dose.  NOTE:This sheet is a summary. It may not cover all possible information. If you have questions about this medicine, talk to your doctor, pharmacist, or health care provider. Copyright© 2017 [employer]

## 2018-09-27 ENCOUNTER — PATIENT OUTREACH (OUTPATIENT)
Dept: ADMINISTRATIVE | Facility: HOSPITAL | Age: 28
End: 2018-09-27

## 2018-10-04 ENCOUNTER — OFFICE VISIT (OUTPATIENT)
Dept: INTERNAL MEDICINE | Facility: CLINIC | Age: 28
End: 2018-10-04
Payer: COMMERCIAL

## 2018-10-04 VITALS
DIASTOLIC BLOOD PRESSURE: 100 MMHG | WEIGHT: 262.56 LBS | HEART RATE: 90 BPM | BODY MASS INDEX: 41.74 KG/M2 | SYSTOLIC BLOOD PRESSURE: 148 MMHG | OXYGEN SATURATION: 99 % | TEMPERATURE: 98 F

## 2018-10-04 DIAGNOSIS — I10 ESSENTIAL HYPERTENSION: ICD-10-CM

## 2018-10-04 PROCEDURE — 99999 PR PBB SHADOW E&M-EST. PATIENT-LVL III: CPT | Mod: PBBFAC,,, | Performed by: FAMILY MEDICINE

## 2018-10-04 PROCEDURE — 99214 OFFICE O/P EST MOD 30 MIN: CPT | Mod: S$GLB,,, | Performed by: FAMILY MEDICINE

## 2018-10-04 RX ORDER — AMLODIPINE BESYLATE 5 MG/1
5 TABLET ORAL DAILY
Qty: 30 TABLET | Refills: 5 | Status: SHIPPED | OUTPATIENT
Start: 2018-10-04 | End: 2019-05-06 | Stop reason: SDUPTHER

## 2018-10-04 RX ORDER — METOPROLOL SUCCINATE 25 MG/1
25 TABLET, EXTENDED RELEASE ORAL DAILY
Qty: 30 TABLET | Refills: 5 | Status: SHIPPED | OUTPATIENT
Start: 2018-10-04 | End: 2019-05-06 | Stop reason: SDUPTHER

## 2018-10-04 NOTE — PATIENT INSTRUCTIONS
Metoprolol extended-release tablets  What is this medicine?  METOPROLOL (me TOE proe lole) is a beta-blocker. Beta-blockers reduce the workload on the heart and help it to beat more regularly. This medicine is used to treat high blood pressure and to prevent chest pain. It is also used to after a heart attack and to prevent an additional heart attack from occurring.  How should I use this medicine?  Take this medicine by mouth with a glass of water. Follow the directions on the prescription label. Do not crush or chew. Take this medicine with or immediately after meals. Take your doses at regular intervals. Do not take more medicine than directed. Do not stop taking this medicine suddenly. This could lead to serious heart-related effects.  Talk to your pediatrician regarding the use of this medicine in children. While this drug may be prescribed for children as young as 6 years for selected conditions, precautions do apply.  What side effects may I notice from receiving this medicine?  Side effects that you should report to your doctor or health care professional as soon as possible:  · allergic reactions like skin rash, itching or hives  · cold or numb hands or feet  · depression  · difficulty breathing  · faint  · fever with sore throat  · irregular heartbeat, chest pain  · rapid weight gain  · swollen legs or ankles  Side effects that usually do not require medical attention (report to your doctor or health care professional if they continue or are bothersome):  · anxiety or nervousness  · change in sex drive or performance  · dry skin  · headache  · nightmares or trouble sleeping  · short term memory loss  · stomach upset or diarrhea  · unusually tired  What may interact with this medicine?  This medicine may interact with the following medications:  · certain medicines for blood pressure, heart disease, irregular heart beat  · certain medicines for depression, like monoamine oxidase (MAO) inhibitors,  fluoxetine, or paroxetine  · clonidine  · dobutamine  · epinephrine  · isoproterenol  · reserpine  What if I miss a dose?  If you miss a dose, take it as soon as you can. If it is almost time for your next dose, take only that dose. Do not take double or extra doses.  Where should I keep my medicine?  Keep out of the reach of children.  Store at room temperature between 15 and 30 degrees C (59 and 86 degrees F). Throw away any unused medicine after the expiration date.  What should I tell my health care provider before I take this medicine?  They need to know if you have any of these conditions:  · diabetes  · heart or vessel disease like slow heart rate, worsening heart failure, heart block, sick sinus syndrome or Raynaud's disease  · kidney disease  · liver disease  · lung or breathing disease, like asthma or emphysema  · pheochromocytoma  · thyroid disease  · an unusual or allergic reaction to metoprolol, other beta-blockers, medicines, foods, dyes, or preservatives  · pregnant or trying to get pregnant  · breast-feeding  What should I watch for while using this medicine?  Visit your doctor or health care professional for regular check ups. Contact your doctor right away if your symptoms worsen. Check your blood pressure and pulse rate regularly. Ask your health care professional what your blood pressure and pulse rate should be, and when you should contact them.  You may get drowsy or dizzy. Do not drive, use machinery, or do anything that needs mental alertness until you know how this medicine affects you. Do not sit or stand up quickly, especially if you are an older patient. This reduces the risk of dizzy or fainting spells. Contact your doctor if these symptoms continue. Alcohol may interfere with the effect of this medicine. Avoid alcoholic drinks.  NOTE:This sheet is a summary. It may not cover all possible information. If you have questions about this medicine, talk to your doctor, pharmacist, or health  care provider. Copyright© 2017 [employer]

## 2018-10-08 ENCOUNTER — PATIENT MESSAGE (OUTPATIENT)
Dept: ENDOCRINOLOGY | Facility: CLINIC | Age: 28
End: 2018-10-08

## 2018-10-12 NOTE — ASSESSMENT & PLAN NOTE
Uncontrolled, continue norvasc 5 mg daily; add metoprolol 25 mg daily; return in 2 weeks for reassessment

## 2018-10-12 NOTE — PROGRESS NOTES
Subjective:       Patient ID: Lianne Mancia is a 28 y.o. female.    Chief Complaint: Follow-up (1 mth follow up)    Patient presents to clinic today for followup of blood pressure. Patient reports compliance with medications.      Review of Systems   Constitutional: Negative for chills, fatigue, fever and unexpected weight change.   Eyes: Negative for visual disturbance.   Respiratory: Negative for shortness of breath.    Cardiovascular: Negative for chest pain.   Musculoskeletal: Negative for myalgias.   Neurological: Negative for headaches.       Objective:      Physical Exam   Constitutional: She is oriented to person, place, and time. She appears well-developed and well-nourished. No distress.   HENT:   Head: Normocephalic and atraumatic.   Eyes: Conjunctivae and EOM are normal. Pupils are equal, round, and reactive to light. No scleral icterus.   Cardiovascular: Normal rate and regular rhythm. Exam reveals no gallop and no friction rub.   No murmur heard.  Pulmonary/Chest: Effort normal and breath sounds normal.   Neurological: She is alert and oriented to person, place, and time. No cranial nerve deficit. Gait normal.   Psychiatric: She has a normal mood and affect.   Vitals reviewed.      Assessment:       1. Essential hypertension        Plan:     Problem List Items Addressed This Visit     Essential hypertension    Current Assessment & Plan     Uncontrolled, continue norvasc 5 mg daily; add metoprolol 25 mg daily; return in 2 weeks for reassessment         Relevant Medications    metoprolol succinate (TOPROL-XL) 25 MG 24 hr tablet    amLODIPine (NORVASC) 5 MG tablet

## 2018-10-18 ENCOUNTER — OFFICE VISIT (OUTPATIENT)
Dept: INTERNAL MEDICINE | Facility: CLINIC | Age: 28
End: 2018-10-18
Payer: COMMERCIAL

## 2018-10-18 VITALS
HEIGHT: 66 IN | WEIGHT: 262.56 LBS | DIASTOLIC BLOOD PRESSURE: 74 MMHG | SYSTOLIC BLOOD PRESSURE: 130 MMHG | BODY MASS INDEX: 42.2 KG/M2 | OXYGEN SATURATION: 99 % | TEMPERATURE: 98 F | HEART RATE: 95 BPM

## 2018-10-18 DIAGNOSIS — I10 ESSENTIAL HYPERTENSION: Primary | ICD-10-CM

## 2018-10-18 PROCEDURE — 99999 PR PBB SHADOW E&M-EST. PATIENT-LVL IV: CPT | Mod: PBBFAC,,, | Performed by: NURSE PRACTITIONER

## 2018-10-18 PROCEDURE — 99213 OFFICE O/P EST LOW 20 MIN: CPT | Mod: S$GLB,,, | Performed by: NURSE PRACTITIONER

## 2018-10-18 NOTE — PROGRESS NOTES
Subjective:       Patient ID: Lianne Mancia is a 28 y.o. female.    Chief Complaint: 2 week followup blood pressure    Patient presents for two week follow up of HTN. norvasc and metoprolol added. He denies swelling. He is otherwise without concern.       Review of Systems   Constitutional: Negative for chills, fatigue, fever and unexpected weight change.   Eyes: Negative for visual disturbance.   Respiratory: Negative for shortness of breath.    Cardiovascular: Negative for chest pain.   Musculoskeletal: Negative for myalgias.   Neurological: Negative for headaches.       Objective:      Physical Exam   Constitutional: She appears well-developed and well-nourished. No distress.   Cardiovascular: Normal rate and regular rhythm.   Pulmonary/Chest: Effort normal and breath sounds normal.   Neurological: She is alert.   Skin: She is not diaphoretic.   Psychiatric: She has a normal mood and affect.   Vitals reviewed.      Assessment:       1. Essential hypertension        Plan:   Essential hypertension  Comments:  controlled, continue norvasc and toprol      Patient unable to stay for pap today. Offered Saturday appts at [facility] but states he will be working saturdays as well until year end. Patient states he will schedule by end of year or beginning of next.  Health Maintenance reviewed/updated.    Follow-up in about 6 months (around 4/18/2019), or if symptoms worsen or fail to improve, for annual wellness/EPP with Dr. Monson.

## 2018-10-23 ENCOUNTER — PATIENT MESSAGE (OUTPATIENT)
Dept: ENDOCRINOLOGY | Facility: CLINIC | Age: 28
End: 2018-10-23

## 2018-11-24 DIAGNOSIS — F64.0 GENDER DYSPHORIA IN ADULT: Primary | ICD-10-CM

## 2018-11-26 RX ORDER — TESTOSTERONE CYPIONATE 200 MG/ML
INJECTION, SOLUTION INTRAMUSCULAR
Qty: 10 ML | Refills: 3 | Status: SHIPPED | OUTPATIENT
Start: 2018-11-26 | End: 2019-06-01 | Stop reason: SDUPTHER

## 2019-05-06 DIAGNOSIS — I10 ESSENTIAL HYPERTENSION: ICD-10-CM

## 2019-05-07 RX ORDER — AMLODIPINE BESYLATE 5 MG/1
TABLET ORAL
Qty: 30 TABLET | Refills: 5 | Status: SHIPPED | OUTPATIENT
Start: 2019-05-07 | End: 2019-12-26

## 2019-05-07 RX ORDER — METOPROLOL SUCCINATE 25 MG/1
TABLET, EXTENDED RELEASE ORAL
Qty: 30 TABLET | Refills: 5 | Status: SHIPPED | OUTPATIENT
Start: 2019-05-07 | End: 2019-12-26

## 2019-06-01 DIAGNOSIS — F64.0 GENDER DYSPHORIA IN ADULT: ICD-10-CM

## 2019-06-03 RX ORDER — TESTOSTERONE CYPIONATE 200 MG/ML
INJECTION, SOLUTION INTRAMUSCULAR
Qty: 10 ML | Refills: 0 | Status: SHIPPED | OUTPATIENT
Start: 2019-06-03 | End: 2019-07-24

## 2019-07-11 ENCOUNTER — PATIENT MESSAGE (OUTPATIENT)
Dept: ENDOCRINOLOGY | Facility: CLINIC | Age: 29
End: 2019-07-11

## 2019-07-18 ENCOUNTER — PATIENT MESSAGE (OUTPATIENT)
Dept: ENDOCRINOLOGY | Facility: CLINIC | Age: 29
End: 2019-07-18

## 2019-07-19 ENCOUNTER — PATIENT MESSAGE (OUTPATIENT)
Dept: ENDOCRINOLOGY | Facility: CLINIC | Age: 29
End: 2019-07-19

## 2019-07-24 ENCOUNTER — OFFICE VISIT (OUTPATIENT)
Dept: ENDOCRINOLOGY | Facility: CLINIC | Age: 29
End: 2019-07-24
Payer: COMMERCIAL

## 2019-07-24 ENCOUNTER — LAB VISIT (OUTPATIENT)
Dept: LAB | Facility: HOSPITAL | Age: 29
End: 2019-07-24
Attending: INTERNAL MEDICINE
Payer: COMMERCIAL

## 2019-07-24 VITALS
HEART RATE: 90 BPM | SYSTOLIC BLOOD PRESSURE: 130 MMHG | HEIGHT: 67 IN | BODY MASS INDEX: 38.99 KG/M2 | DIASTOLIC BLOOD PRESSURE: 100 MMHG | WEIGHT: 248.44 LBS

## 2019-07-24 DIAGNOSIS — E78.6 LOW HDL (UNDER 40): ICD-10-CM

## 2019-07-24 DIAGNOSIS — I10 ESSENTIAL HYPERTENSION: ICD-10-CM

## 2019-07-24 DIAGNOSIS — E55.9 VITAMIN D DEFICIENCY: ICD-10-CM

## 2019-07-24 DIAGNOSIS — E66.01 MORBID OBESITY DUE TO EXCESS CALORIES: ICD-10-CM

## 2019-07-24 LAB
25(OH)D3+25(OH)D2 SERPL-MCNC: 11 NG/ML (ref 30–96)
ALBUMIN SERPL BCP-MCNC: 4.1 G/DL (ref 3.5–5.2)
ALP SERPL-CCNC: 65 U/L (ref 55–135)
ALT SERPL W/O P-5'-P-CCNC: 18 U/L (ref 10–44)
ANION GAP SERPL CALC-SCNC: 8 MMOL/L (ref 8–16)
AST SERPL-CCNC: 30 U/L (ref 10–40)
BASOPHILS # BLD AUTO: 0.05 K/UL (ref 0–0.2)
BASOPHILS NFR BLD: 0.7 % (ref 0–1.9)
BILIRUB SERPL-MCNC: 0.6 MG/DL (ref 0.1–1)
BUN SERPL-MCNC: 9 MG/DL (ref 6–20)
CALCIUM SERPL-MCNC: 10.8 MG/DL (ref 8.7–10.5)
CHLORIDE SERPL-SCNC: 100 MMOL/L (ref 95–110)
CHOLEST SERPL-MCNC: 187 MG/DL (ref 120–199)
CHOLEST/HDLC SERPL: 5.3 {RATIO} (ref 2–5)
CO2 SERPL-SCNC: 31 MMOL/L (ref 23–29)
CREAT SERPL-MCNC: 1 MG/DL (ref 0.5–1.4)
DIFFERENTIAL METHOD: ABNORMAL
EOSINOPHIL # BLD AUTO: 0.1 K/UL (ref 0–0.5)
EOSINOPHIL NFR BLD: 0.8 % (ref 0–8)
ERYTHROCYTE [DISTWIDTH] IN BLOOD BY AUTOMATED COUNT: 12.3 % (ref 11.5–14.5)
EST. GFR  (AFRICAN AMERICAN): >60 ML/MIN/1.73 M^2
EST. GFR  (NON AFRICAN AMERICAN): >60 ML/MIN/1.73 M^2
ESTIMATED AVG GLUCOSE: 100 MG/DL (ref 68–131)
GLUCOSE SERPL-MCNC: 85 MG/DL (ref 70–110)
HBA1C MFR BLD HPLC: 5.1 % (ref 4–5.6)
HCT VFR BLD AUTO: 54.4 % (ref 37–48.5)
HDLC SERPL-MCNC: 35 MG/DL (ref 40–75)
HDLC SERPL: 18.7 % (ref 20–50)
HGB BLD-MCNC: 17.8 G/DL (ref 12–16)
IMM GRANULOCYTES # BLD AUTO: 0.06 K/UL (ref 0–0.04)
IMM GRANULOCYTES NFR BLD AUTO: 0.8 % (ref 0–0.5)
LDLC SERPL CALC-MCNC: 99.6 MG/DL (ref 63–159)
LYMPHOCYTES # BLD AUTO: 2.2 K/UL (ref 1–4.8)
LYMPHOCYTES NFR BLD: 29.6 % (ref 18–48)
MCH RBC QN AUTO: 31.4 PG (ref 27–31)
MCHC RBC AUTO-ENTMCNC: 32.7 G/DL (ref 32–36)
MCV RBC AUTO: 96 FL (ref 82–98)
MONOCYTES # BLD AUTO: 0.8 K/UL (ref 0.3–1)
MONOCYTES NFR BLD: 10.7 % (ref 4–15)
NEUTROPHILS # BLD AUTO: 4.2 K/UL (ref 1.8–7.7)
NEUTROPHILS NFR BLD: 57.4 % (ref 38–73)
NONHDLC SERPL-MCNC: 152 MG/DL
NRBC BLD-RTO: 0 /100 WBC
PLATELET # BLD AUTO: 264 K/UL (ref 150–350)
PMV BLD AUTO: 11.8 FL (ref 9.2–12.9)
POTASSIUM SERPL-SCNC: 4 MMOL/L (ref 3.5–5.1)
PROT SERPL-MCNC: 7.7 G/DL (ref 6–8.4)
RBC # BLD AUTO: 5.67 M/UL (ref 4–5.4)
SODIUM SERPL-SCNC: 139 MMOL/L (ref 136–145)
TRIGL SERPL-MCNC: 262 MG/DL (ref 30–150)
WBC # BLD AUTO: 7.3 K/UL (ref 3.9–12.7)

## 2019-07-24 PROCEDURE — 99999 PR PBB SHADOW E&M-EST. PATIENT-LVL III: CPT | Mod: PBBFAC,,, | Performed by: INTERNAL MEDICINE

## 2019-07-24 PROCEDURE — 82306 VITAMIN D 25 HYDROXY: CPT

## 2019-07-24 PROCEDURE — 36415 COLL VENOUS BLD VENIPUNCTURE: CPT

## 2019-07-24 PROCEDURE — 80053 COMPREHEN METABOLIC PANEL: CPT

## 2019-07-24 PROCEDURE — 99214 OFFICE O/P EST MOD 30 MIN: CPT | Mod: S$GLB,,, | Performed by: INTERNAL MEDICINE

## 2019-07-24 PROCEDURE — 83036 HEMOGLOBIN GLYCOSYLATED A1C: CPT

## 2019-07-24 PROCEDURE — 99999 PR PBB SHADOW E&M-EST. PATIENT-LVL III: ICD-10-PCS | Mod: PBBFAC,,, | Performed by: INTERNAL MEDICINE

## 2019-07-24 PROCEDURE — 85025 COMPLETE CBC W/AUTO DIFF WBC: CPT

## 2019-07-24 PROCEDURE — 99214 PR OFFICE/OUTPT VISIT, EST, LEVL IV, 30-39 MIN: ICD-10-PCS | Mod: S$GLB,,, | Performed by: INTERNAL MEDICINE

## 2019-07-24 PROCEDURE — 80061 LIPID PANEL: CPT

## 2019-07-24 RX ORDER — TESTOSTERONE CYPIONATE 200 MG/ML
200 INJECTION, SOLUTION INTRAMUSCULAR
Qty: 10 ML | Refills: 5 | Status: SHIPPED | OUTPATIENT
Start: 2019-07-24 | End: 2020-01-27

## 2019-07-24 NOTE — ASSESSMENT & PLAN NOTE
Transman: gender incongruence   Reviewed therapy, side effects (both wanted and unwanted), possible adverse outcomes, expectations, compliance.  Reviewed limited data on fertility     Reviewed the need for contraception as testosterone is not a contraceptive if having vaginal sex with non-trans men       Will   continue Testosterone replacement therapy 200 mg q 2 weeks   Labs  today   RTC in 12  months with labs   Noting  Nl hh/ for men is:     Hemoglobin 14.0-18.0 g/dL    Hematocrit 40.0-54.0 %        Healthy lifestyle stressed

## 2019-07-24 NOTE — ASSESSMENT & PLAN NOTE
IGT - alerted as to the increased risk for t2DM  Stressed diet and exercise  Goal 5-7% weight loss    Periodic hba1c levels

## 2019-07-24 NOTE — PROGRESS NOTES
Subjective:      Patient ID: Lianne Mancia is a 29 y.o. female.    Chief Complaint:  Gender dysphoria and Vitamin D Deficiency      History of Present Illness  Here for his  trans care   going on vacation soon       First identified as a man in HS       started cross hormone therapy 9/20/16  trt dose  200 mg q  2 Week - fridays  - he prefers q 2 weeks      No menses --lmp 2015 before provera      He is pleased with the changes so far  No concerns   He is self injecting            Therapist was Benny Fraga   Letter was provided attesting to readiness for TRT         Relationships with women -single currently          Friends supportive   Mother not supportive but she aware (she does not know he is on trt) - he lives with her  Dad is aware     his sister is very supportive      Working at [employer] - he has told a few people   He uses female lockers and restrooms   He believes they will cover trans surgery - he is interested in mastectomy                  tob - no  etoh - no   Drugs - no      With regards to morbid  Obesity Denies symptoms of hyperglycemia such as polyuria, polydipsia, nocturia,       weight loss with exercise     With regards to the vitamin d deficiency:  Currently taking otc 2000 iu a day     With regards to the dyslipidemia (low hdl)   He is exercising and working on bulking up           Review of Systems   Constitutional: Negative for unexpected weight change.   Eyes: Negative for visual disturbance.   Respiratory: Negative for shortness of breath.    Cardiovascular: Negative for chest pain.   Gastrointestinal: Negative for abdominal pain.   Musculoskeletal: Negative for arthralgias.   Skin: Negative for wound.   Neurological: Negative for headaches.   Hematological: Does not bruise/bleed easily.   Psychiatric/Behavioral: Negative for sleep disturbance.       Objective:   Physical Exam   Neck: No thyromegaly present.   Cardiovascular: Normal rate.   Pulmonary/Chest: Effort normal.    Abdominal: Soft.   Musculoskeletal: She exhibits no edema.   Vitals reviewed.    +acanthosis   Body mass index is 39.5 kg/m².    Lab Review:   Lab Results   Component Value Date    HGBA1C 5.1 05/10/2018     Lab Results   Component Value Date    CHOL 182 05/10/2018    HDL 33 (L) 05/10/2018    LDLCALC 131.8 05/10/2018    TRIG 86 05/10/2018    CHOLHDL 18.1 (L) 05/10/2018     Lab Results   Component Value Date     05/10/2018    K 3.8 05/10/2018     05/10/2018    CO2 30 (H) 05/10/2018    GLU 93 05/10/2018    BUN 5 (L) 05/10/2018    CREATININE 0.9 05/10/2018    CALCIUM 10.0 05/10/2018    PROT 8.3 05/10/2018    ALBUMIN 4.4 05/10/2018    BILITOT 0.5 05/10/2018    ALKPHOS 78 05/10/2018    AST 20 05/10/2018    ALT 18 05/10/2018    ANIONGAP 8 05/10/2018    ESTGFRAFRICA >60.0 05/10/2018    EGFRNONAA >60.0 05/10/2018    TSH 1.991 05/10/2018       Assessment and Plan     Endocrine disorder in female-to-male transgender person  Transman: gender incongruence   Reviewed therapy, side effects (both wanted and unwanted), possible adverse outcomes, expectations, compliance.  Reviewed limited data on fertility     Reviewed the need for contraception as testosterone is not a contraceptive if having vaginal sex with non-trans men       Will   continue Testosterone replacement therapy 200 mg q 2 weeks   Labs  today   RTC in 12  months with labs   Noting  Nl hh/ for men is:     Hemoglobin 14.0-18.0 g/dL    Hematocrit 40.0-54.0 %        Healthy lifestyle stressed       Low HDL (under 40)  Continue to work on diet, exercise and weight loss     Essential hypertension  He declined changes today   Check at home/work  Call if > 130/90     Vitamin D deficiency   over the counter vitamin D 2000 iu a day      Morbid obesity due to excess calories   IGT - alerted as to the increased risk for t2DM  Stressed diet and exercise  Goal 5-7% weight loss    Periodic hba1c levels

## 2019-08-23 ENCOUNTER — TELEPHONE (OUTPATIENT)
Dept: INTERNAL MEDICINE | Facility: CLINIC | Age: 29
End: 2019-08-23

## 2019-10-07 DIAGNOSIS — F64.0 GENDER DYSPHORIA IN ADULT: Primary | ICD-10-CM

## 2019-10-07 RX ORDER — SYRINGE WITH NEEDLE, 1 ML 25GX5/8"
SYRINGE, EMPTY DISPOSABLE MISCELLANEOUS
Qty: 10 SYRINGE | Refills: 5 | Status: SHIPPED | OUTPATIENT
Start: 2019-10-07 | End: 2019-12-18 | Stop reason: SDUPTHER

## 2019-11-16 DIAGNOSIS — F64.0 GENDER DYSPHORIA IN ADULT: Primary | ICD-10-CM

## 2019-11-18 LAB
CHOL/HDLC RATIO: 4.9
CHOLEST SERPL-MSCNC: 183 MG/DL (ref 0–200)
HDLC SERPL-MCNC: 37 MG/DL
LDLC SERPL CALC-MCNC: 121 MG/DL (ref 0–160)
TRIGL SERPL-MCNC: 125 MG/DL
TSH SERPL DL<=0.005 MIU/L-ACNC: 2.3 UIU/ML (ref 0.41–5.9)
VLDLC SERPL-MCNC: 25 MG/DL

## 2019-11-18 RX ORDER — SYRINGE WITH NEEDLE, 1 ML 25GX5/8"
SYRINGE, EMPTY DISPOSABLE MISCELLANEOUS
Qty: 12 SYRINGE | Refills: 3 | Status: SHIPPED | OUTPATIENT
Start: 2019-11-18 | End: 2019-12-18 | Stop reason: SDUPTHER

## 2019-12-17 ENCOUNTER — OFFICE VISIT (OUTPATIENT)
Dept: INTERNAL MEDICINE | Facility: CLINIC | Age: 29
End: 2019-12-17
Payer: COMMERCIAL

## 2019-12-17 VITALS
BODY MASS INDEX: 38.18 KG/M2 | OXYGEN SATURATION: 97 % | WEIGHT: 243.25 LBS | HEIGHT: 67 IN | HEART RATE: 95 BPM | SYSTOLIC BLOOD PRESSURE: 130 MMHG | DIASTOLIC BLOOD PRESSURE: 80 MMHG | TEMPERATURE: 100 F

## 2019-12-17 DIAGNOSIS — A60.04 HERPES SIMPLEX VULVOVAGINITIS: Primary | ICD-10-CM

## 2019-12-17 PROCEDURE — 99999 PR PBB SHADOW E&M-EST. PATIENT-LVL III: CPT | Mod: PBBFAC,,, | Performed by: FAMILY MEDICINE

## 2019-12-17 PROCEDURE — 99999 PR PBB SHADOW E&M-EST. PATIENT-LVL III: ICD-10-PCS | Mod: PBBFAC,,, | Performed by: FAMILY MEDICINE

## 2019-12-17 PROCEDURE — 99214 PR OFFICE/OUTPT VISIT, EST, LEVL IV, 30-39 MIN: ICD-10-PCS | Mod: S$GLB,,, | Performed by: FAMILY MEDICINE

## 2019-12-17 PROCEDURE — 99214 OFFICE O/P EST MOD 30 MIN: CPT | Mod: S$GLB,,, | Performed by: FAMILY MEDICINE

## 2019-12-17 RX ORDER — VALACYCLOVIR HYDROCHLORIDE 1 G/1
1000 TABLET, FILM COATED ORAL 2 TIMES DAILY
Qty: 20 TABLET | Refills: 0 | Status: SHIPPED | OUTPATIENT
Start: 2019-12-17 | End: 2019-12-27

## 2019-12-17 NOTE — PROGRESS NOTES
Subjective:       Patient ID: Lianne Mancia is a 29 y.o. female.    Chief Complaint: genital pain (started on saturday she said it seemed like it has gradually gotten worse. She thought it was a UTI. She had bought some otc meds for it to help. It has helped some but the pain is still there)    Patient reports burning with urination since Saturday. Also report discomfort when wiping and general discomfort of vulva. No vaginal discharge. No new partners.     Review of Systems   Constitutional: Negative for chills and fever.   Genitourinary: Positive for dysuria and genital sores. Negative for difficulty urinating, frequency, urgency and vaginal discharge.       Objective:      Physical Exam   Constitutional: She is oriented to person, place, and time. She appears well-developed and well-nourished. No distress.   HENT:   Head: Normocephalic and atraumatic.   Eyes: Pupils are equal, round, and reactive to light. Conjunctivae and EOM are normal. No scleral icterus.   Pulmonary/Chest: Effort normal.   Genitourinary: There is lesion on the right labia. There is lesion on the left labia.   Genitourinary Comments: Ulcerated lesions of vulva   Neurological: She is alert and oriented to person, place, and time. No cranial nerve deficit. Gait normal.   Psychiatric: She has a normal mood and affect.   Vitals reviewed.      Assessment:       1. Herpes simplex vulvovaginitis        Plan:     Problem List Items Addressed This Visit     None      Visit Diagnoses     Herpes simplex vulvovaginitis    -  Primary    Relevant Medications    valACYclovir (VALTREX) 1000 MG tablet    Other Relevant Orders    HSV by Rapid PCR, Non-Blood [facility]; Vagina

## 2019-12-17 NOTE — PATIENT INSTRUCTIONS
Herpes  If you have herpes, youre not alone. Millions of Americans have it. Herpes has no cure. But you can control it and learn how to protect yourself and others from outbreaks.  What is herpes?  Herpes is a chronic (lifelong) virus. It can cause sores and discomfort. You get it from contact with someone who carries the virus. If sores occur on the lips, you have oral herpes. If sores occur on the penis or around the vagina, you have genital herpes.  Herpes outbreaks  · The first outbreak of herpes sores is usually the most severe. Then, the soldiers of the bodys immune system, white blood cells, produce antibodies. These antibodies help neutralize the herpes virus and may help make future attacks less severe.  · Some people have only one outbreak of sores. Some people have periods of frequent outbreaks (every few weeks). Outbreaks of herpes sores usually happen less often over time.  · Herpes sores may appear without a cause. Outbreaks are more likely when the immune system is weak. Other viral infections (such as a cold) can cause outbreaks. Stress from a poor diet, fatigue, or emotional upset can lead to outbreaks of sores. Exposure to strong sunlight often causes herpes sores to reappear.   To help prevent outbreaks  · To prevent oral herpes outbreaks, avoid overexposure to wind, sun, and extreme temperatures. Use sunscreen and lip balm on affected areas.  · If you are having frequent outbreaks, ask your healthcare provider about medicines that can help prevent outbreaks.  How herpes spreads to others  Herpes can be spread during an outbreak. But even without sores present, you can still shed the virus and infect others. You can take steps to prevent this.  To protect yourself and others  · If you have an oral sore, avoid kissing and oral-genital contact.  · If you have a genital sore, avoid intercourse. Also avoid oral-genital contact.  · Wash your hands after touching a sore.  · Use a condom each time  you have sex. You can pass the virus even when sores arent present. If youre unsure about the timing of certain kinds of physical contact, ask your health care provider.  · Tell any new partners that you have herpes.  · If youre a woman, have Pap tests as often as your healthcare provider recommends.  · A woman can spread herpes to their  during the birth process, whether or not they have an active genital sore. If pregnant, don't forget to tell your healthcare provider early in the pregnancy.   · In some cases, daily antiviral medicine (acyclovir, famcyclovir, or valavyclovir), in addition to consistent condom use, may reduce your chances of spreading herpes to an uninfected partner. Ask your healthcare provider if this medicine would be helpful for you.  Resources  [employer] STD Hotline  468.345.3342  www.ashastd.org  Centers for Disease Control and Prevention  190.823.4735  www.cdc.gov/std   Date Last Reviewed: 2016-2017 [employer]. [address] [place] [address], [place] [address]. All rights reserved. This information is not intended as a substitute for professional medical care. Always follow your healthcare professional's instructions.

## 2019-12-18 DIAGNOSIS — F64.0 GENDER DYSPHORIA IN ADULT: ICD-10-CM

## 2019-12-19 ENCOUNTER — PATIENT MESSAGE (OUTPATIENT)
Dept: INTERNAL MEDICINE | Facility: CLINIC | Age: 29
End: 2019-12-19

## 2019-12-25 DIAGNOSIS — I10 ESSENTIAL HYPERTENSION: ICD-10-CM

## 2019-12-26 RX ORDER — AMLODIPINE BESYLATE 5 MG/1
TABLET ORAL
Qty: 30 TABLET | Refills: 5 | OUTPATIENT
Start: 2019-12-26

## 2019-12-26 RX ORDER — METOPROLOL SUCCINATE 25 MG/1
TABLET, EXTENDED RELEASE ORAL
Qty: 30 TABLET | Refills: 5 | Status: SHIPPED | OUTPATIENT
Start: 2019-12-26 | End: 2020-09-09

## 2019-12-26 RX ORDER — AMLODIPINE BESYLATE 5 MG/1
TABLET ORAL
Qty: 30 TABLET | Refills: 5 | Status: SHIPPED | OUTPATIENT
Start: 2019-12-26 | End: 2020-07-11

## 2020-01-24 DIAGNOSIS — F64.0 GENDER DYSPHORIA IN ADULT: Primary | ICD-10-CM

## 2020-01-27 RX ORDER — TESTOSTERONE CYPIONATE 200 MG/ML
INJECTION, SOLUTION INTRAMUSCULAR
Qty: 10 ML | Refills: 2 | Status: SHIPPED | OUTPATIENT
Start: 2020-01-27 | End: 2020-08-10 | Stop reason: SDUPTHER

## 2020-04-20 ENCOUNTER — TELEPHONE (OUTPATIENT)
Dept: ENDOCRINOLOGY | Facility: CLINIC | Age: 30
End: 2020-04-20

## 2020-07-11 ENCOUNTER — OFFICE VISIT (OUTPATIENT)
Dept: INTERNAL MEDICINE | Facility: CLINIC | Age: 30
End: 2020-07-11
Payer: COMMERCIAL

## 2020-07-11 VITALS
TEMPERATURE: 98 F | DIASTOLIC BLOOD PRESSURE: 100 MMHG | BODY MASS INDEX: 42.24 KG/M2 | SYSTOLIC BLOOD PRESSURE: 132 MMHG | HEIGHT: 66 IN | WEIGHT: 262.81 LBS | HEART RATE: 86 BPM | OXYGEN SATURATION: 96 %

## 2020-07-11 DIAGNOSIS — I10 ESSENTIAL HYPERTENSION: ICD-10-CM

## 2020-07-11 PROCEDURE — 99214 OFFICE O/P EST MOD 30 MIN: CPT | Mod: S$GLB,,, | Performed by: FAMILY MEDICINE

## 2020-07-11 PROCEDURE — 99999 PR PBB SHADOW E&M-EST. PATIENT-LVL III: CPT | Mod: PBBFAC,,, | Performed by: FAMILY MEDICINE

## 2020-07-11 PROCEDURE — 99214 PR OFFICE/OUTPT VISIT, EST, LEVL IV, 30-39 MIN: ICD-10-PCS | Mod: S$GLB,,, | Performed by: FAMILY MEDICINE

## 2020-07-11 PROCEDURE — 99999 PR PBB SHADOW E&M-EST. PATIENT-LVL III: ICD-10-PCS | Mod: PBBFAC,,, | Performed by: FAMILY MEDICINE

## 2020-07-11 RX ORDER — AMLODIPINE BESYLATE 10 MG/1
10 TABLET ORAL DAILY
Qty: 90 TABLET | Refills: 1 | Status: SHIPPED | OUTPATIENT
Start: 2020-07-11 | End: 2020-09-17 | Stop reason: SDUPTHER

## 2020-07-11 NOTE — PROGRESS NOTES
Subjective:       Patient ID: Lianne Mancia is a 30 y.o. female.    Chief Complaint: Headache and Hypertension    Reports bad headache on Tuesday. No vision changes. + nausea, no vomiting. Better on Wednesday. Seen at employee [facility], /102 (automatic reading), manual 158/92. Works at [employer].     Hypertension  This is a chronic problem. The current episode started more than 1 year ago. The problem has been rapidly worsening since onset. The problem is resistant. Associated symptoms include headaches and sweats. Pertinent negatives include no anxiety, blurred vision, chest pain, malaise/fatigue, neck pain, orthopnea, palpitations, peripheral edema, PND or shortness of breath. Risk factors for coronary artery disease include obesity, sedentary lifestyle and stress. Past treatments include beta blockers. The current treatment provides moderate improvement. Compliance problems include diet and exercise.      Review of Systems   Constitutional: Negative for malaise/fatigue.   Eyes: Negative for blurred vision.   Respiratory: Negative for shortness of breath.    Cardiovascular: Negative for chest pain, palpitations, orthopnea and PND.   Musculoskeletal: Negative for neck pain.   Neurological: Positive for headaches.       Objective:      Physical Exam  Vitals signs reviewed.   Constitutional:       General: She is not in acute distress.     Appearance: She is well-developed.   HENT:      Head: Normocephalic and atraumatic.   Eyes:      General: No scleral icterus.     Conjunctiva/sclera: Conjunctivae normal.      Pupils: Pupils are equal, round, and reactive to light.   Pulmonary:      Effort: Pulmonary effort is normal.   Neurological:      Mental Status: She is alert and oriented to person, place, and time.      Cranial Nerves: No cranial nerve deficit.      Gait: Gait normal.         Assessment:       1. Essential hypertension        Plan:     Problem List Items Addressed This Visit      Essential hypertension    Current Assessment & Plan     Uncontrolled, increased norvasc to 10 mg daily, continue metoprolol 25 mg daily         Relevant Medications    amLODIPine (NORVASC) 10 MG tablet    Other Relevant Orders    Hypertension Digital Medicine (HDMP) Enrollment Order (Completed)    Hypertension Digital Medicine (HDMP): Assign Onboarding Questionnaires (Completed)        Seek immediate medical attention for severe headache or HA associated with vomiting, vision change. Notify me if HA persist.

## 2020-07-28 ENCOUNTER — PATIENT OUTREACH (OUTPATIENT)
Dept: ADMINISTRATIVE | Facility: HOSPITAL | Age: 30
End: 2020-07-28

## 2020-08-10 ENCOUNTER — PATIENT OUTREACH (OUTPATIENT)
Dept: OTHER | Facility: OTHER | Age: 30
End: 2020-08-10

## 2020-09-17 DIAGNOSIS — I10 ESSENTIAL HYPERTENSION: ICD-10-CM

## 2020-09-17 RX ORDER — METOPROLOL SUCCINATE 25 MG/1
25 TABLET, EXTENDED RELEASE ORAL DAILY
Qty: 90 TABLET | Refills: 1 | Status: SHIPPED | OUTPATIENT
Start: 2020-09-17 | End: 2020-11-04 | Stop reason: SDUPTHER

## 2020-09-17 RX ORDER — AMLODIPINE BESYLATE 10 MG/1
10 TABLET ORAL DAILY
Qty: 90 TABLET | Refills: 1 | Status: SHIPPED | OUTPATIENT
Start: 2020-09-17 | End: 2020-11-04 | Stop reason: SDUPTHER

## 2020-10-21 ENCOUNTER — TELEPHONE (OUTPATIENT)
Dept: INTERNAL MEDICINE | Facility: CLINIC | Age: 30
End: 2020-10-21

## 2020-10-21 NOTE — TELEPHONE ENCOUNTER
----- Message from Megan Lala sent at 10/21/2020  4:12 PM CDT -----  Regarding: [facility] Enrollment  Dr. Monson,    Thank you for referring your patient, Dean Mancia to Digital Medicine. We have made multiple attempts to complete enrollment but have been unable to reach him.      We will continue trying but wanted you to be aware so that you or your staff can intervene, as needed.       Please let me know if you have any additional questions.     Sincerely,   [facility] [facility]

## 2020-11-04 DIAGNOSIS — I10 ESSENTIAL HYPERTENSION: ICD-10-CM

## 2020-11-04 RX ORDER — METOPROLOL SUCCINATE 25 MG/1
25 TABLET, EXTENDED RELEASE ORAL DAILY
Qty: 90 TABLET | Refills: 0 | Status: SHIPPED | OUTPATIENT
Start: 2020-11-04 | End: 2020-12-10 | Stop reason: SDUPTHER

## 2020-11-04 RX ORDER — AMLODIPINE BESYLATE 10 MG/1
10 TABLET ORAL DAILY
Qty: 90 TABLET | Refills: 0 | Status: SHIPPED | OUTPATIENT
Start: 2020-11-04 | End: 2020-12-10 | Stop reason: SDUPTHER

## 2020-12-10 DIAGNOSIS — I10 ESSENTIAL HYPERTENSION: ICD-10-CM

## 2020-12-10 RX ORDER — AMLODIPINE BESYLATE 10 MG/1
10 TABLET ORAL DAILY
Qty: 90 TABLET | Refills: 1 | Status: SHIPPED | OUTPATIENT
Start: 2020-12-10 | End: 2021-02-27 | Stop reason: SDUPTHER

## 2020-12-10 RX ORDER — METOPROLOL SUCCINATE 25 MG/1
25 TABLET, EXTENDED RELEASE ORAL DAILY
Qty: 90 TABLET | Refills: 1 | Status: SHIPPED | OUTPATIENT
Start: 2020-12-10 | End: 2021-02-27 | Stop reason: SDUPTHER

## 2020-12-23 ENCOUNTER — LAB VISIT (OUTPATIENT)
Dept: LAB | Facility: HOSPITAL | Age: 30
End: 2020-12-23
Attending: FAMILY MEDICINE
Payer: COMMERCIAL

## 2020-12-23 ENCOUNTER — OFFICE VISIT (OUTPATIENT)
Dept: INTERNAL MEDICINE | Facility: CLINIC | Age: 30
End: 2020-12-23
Payer: COMMERCIAL

## 2020-12-23 VITALS
OXYGEN SATURATION: 98 % | DIASTOLIC BLOOD PRESSURE: 80 MMHG | TEMPERATURE: 98 F | BODY MASS INDEX: 42.13 KG/M2 | WEIGHT: 262.13 LBS | HEIGHT: 66 IN | HEART RATE: 85 BPM | SYSTOLIC BLOOD PRESSURE: 124 MMHG

## 2020-12-23 DIAGNOSIS — E55.9 VITAMIN D DEFICIENCY: ICD-10-CM

## 2020-12-23 DIAGNOSIS — I10 ESSENTIAL HYPERTENSION: Primary | ICD-10-CM

## 2020-12-23 DIAGNOSIS — Z11.59 NEED FOR HEPATITIS C SCREENING TEST: ICD-10-CM

## 2020-12-23 DIAGNOSIS — I10 ESSENTIAL HYPERTENSION: ICD-10-CM

## 2020-12-23 LAB
25(OH)D3+25(OH)D2 SERPL-MCNC: 6 NG/ML (ref 30–96)
ANION GAP SERPL CALC-SCNC: 7 MMOL/L (ref 8–16)
BUN SERPL-MCNC: 7 MG/DL (ref 6–20)
CALCIUM SERPL-MCNC: 9.5 MG/DL (ref 8.7–10.5)
CHLORIDE SERPL-SCNC: 101 MMOL/L (ref 95–110)
CO2 SERPL-SCNC: 30 MMOL/L (ref 23–29)
CREAT SERPL-MCNC: 1.1 MG/DL (ref 0.5–1.4)
EST. GFR  (AFRICAN AMERICAN): >60 ML/MIN/1.73 M^2
EST. GFR  (NON AFRICAN AMERICAN): >60 ML/MIN/1.73 M^2
GLUCOSE SERPL-MCNC: 100 MG/DL (ref 70–110)
POTASSIUM SERPL-SCNC: 3.6 MMOL/L (ref 3.5–5.1)
SODIUM SERPL-SCNC: 138 MMOL/L (ref 136–145)

## 2020-12-23 PROCEDURE — 99213 PR OFFICE/OUTPT VISIT, EST, LEVL III, 20-29 MIN: ICD-10-PCS | Mod: S$GLB,,, | Performed by: FAMILY MEDICINE

## 2020-12-23 PROCEDURE — 99999 PR PBB SHADOW E&M-EST. PATIENT-LVL III: ICD-10-PCS | Mod: PBBFAC,,, | Performed by: FAMILY MEDICINE

## 2020-12-23 PROCEDURE — 99213 OFFICE O/P EST LOW 20 MIN: CPT | Mod: S$GLB,,, | Performed by: FAMILY MEDICINE

## 2020-12-23 PROCEDURE — 82306 VITAMIN D 25 HYDROXY: CPT

## 2020-12-23 PROCEDURE — 80048 BASIC METABOLIC PNL TOTAL CA: CPT

## 2020-12-23 PROCEDURE — 86803 HEPATITIS C AB TEST: CPT

## 2020-12-23 PROCEDURE — 99999 PR PBB SHADOW E&M-EST. PATIENT-LVL III: CPT | Mod: PBBFAC,,, | Performed by: FAMILY MEDICINE

## 2020-12-23 PROCEDURE — 36415 COLL VENOUS BLD VENIPUNCTURE: CPT

## 2020-12-23 NOTE — PROGRESS NOTES
Subjective:       Patient ID: Lianne Mancia is a 30 y.o. female.    Chief Complaint: Blood Pressure Check    Patient presents to clinic today for followup of hypertension. Patient taking amlodipine and metoprolol as directed. Patient relocating to [place] in the near future. Patient is otherwise without concerns today.      Review of Systems   Constitutional: Negative for chills, fatigue, fever and unexpected weight change.   Eyes: Negative for visual disturbance.   Respiratory: Negative for shortness of breath.    Cardiovascular: Negative for chest pain.   Musculoskeletal: Negative for myalgias.   Neurological: Negative for headaches.         Objective:      Physical Exam  Vitals signs reviewed.   Constitutional:       General: She is not in acute distress.     Appearance: She is well-developed.   HENT:      Head: Normocephalic and atraumatic.   Eyes:      General: Lids are normal. No scleral icterus.     Extraocular Movements: Extraocular movements intact.      Conjunctiva/sclera: Conjunctivae normal.      Pupils: Pupils are equal, round, and reactive to light.   Pulmonary:      Effort: Pulmonary effort is normal.   Neurological:      Mental Status: She is alert and oriented to person, place, and time.      Cranial Nerves: No cranial nerve deficit.      Gait: Gait normal.   Psychiatric:         Mood and Affect: Mood and affect normal.         Assessment:       1. Essential hypertension    2. Vitamin D deficiency    3. Need for hepatitis C screening test        Plan:     Problem List Items Addressed This Visit     Essential hypertension - Primary    Current Assessment & Plan     Controlled, continue amlodipine and metoprolol           Relevant Orders    Basic Metabolic Panel    Vitamin D deficiency    Current Assessment & Plan     Status pending labs, continue supplement           Relevant Orders    Vitamin D      Other Visit Diagnoses     Need for hepatitis C screening test        Relevant Orders     Hepatitis C Antibody          Health Maintenance reviewed/updated. Declines HIV screen.

## 2020-12-24 LAB — HCV AB SERPL QL IA: NEGATIVE

## 2021-01-06 ENCOUNTER — PATIENT MESSAGE (OUTPATIENT)
Dept: ENDOCRINOLOGY | Facility: CLINIC | Age: 31
End: 2021-01-06

## 2021-02-27 DIAGNOSIS — F64.0 GENDER DYSPHORIA IN ADULT: ICD-10-CM

## 2021-02-27 DIAGNOSIS — I10 ESSENTIAL HYPERTENSION: ICD-10-CM

## 2021-03-01 RX ORDER — SYRINGE WITH NEEDLE, 1 ML 25GX5/8"
SYRINGE, EMPTY DISPOSABLE MISCELLANEOUS
Qty: 10 SYRINGE | Refills: 5 | Status: SHIPPED | OUTPATIENT
Start: 2021-03-01 | End: 2021-08-10 | Stop reason: SDUPTHER

## 2021-03-01 RX ORDER — SYRINGE WITH NEEDLE, 1 ML 25GX5/8"
SYRINGE, EMPTY DISPOSABLE MISCELLANEOUS
Qty: 12 SYRINGE | Refills: 3 | Status: SHIPPED | OUTPATIENT
Start: 2021-03-01 | End: 2021-08-10 | Stop reason: SDUPTHER

## 2021-03-01 RX ORDER — TESTOSTERONE CYPIONATE 200 MG/ML
200 INJECTION, SOLUTION INTRAMUSCULAR
Qty: 10 ML | Refills: 0 | Status: SHIPPED | OUTPATIENT
Start: 2021-03-01 | End: 2021-08-10 | Stop reason: SDUPTHER

## 2021-03-02 RX ORDER — METOPROLOL SUCCINATE 25 MG/1
25 TABLET, EXTENDED RELEASE ORAL DAILY
Qty: 90 TABLET | Refills: 3 | Status: SHIPPED | OUTPATIENT
Start: 2021-03-02

## 2021-03-02 RX ORDER — AMLODIPINE BESYLATE 10 MG/1
10 TABLET ORAL DAILY
Qty: 90 TABLET | Refills: 3 | Status: SHIPPED | OUTPATIENT
Start: 2021-03-02

## 2021-08-10 ENCOUNTER — PATIENT MESSAGE (OUTPATIENT)
Dept: ENDOCRINOLOGY | Facility: CLINIC | Age: 31
End: 2021-08-10

## 2021-08-10 DIAGNOSIS — F64.0 GENDER DYSPHORIA IN ADULT: ICD-10-CM

## 2021-08-11 RX ORDER — SYRINGE WITH NEEDLE, 1 ML 25GX5/8"
SYRINGE, EMPTY DISPOSABLE MISCELLANEOUS
Qty: 10 SYRINGE | Refills: 5 | Status: SHIPPED | OUTPATIENT
Start: 2021-08-11 | End: 2021-12-13 | Stop reason: SDUPTHER

## 2021-08-11 RX ORDER — TESTOSTERONE CYPIONATE 200 MG/ML
200 INJECTION, SOLUTION INTRAMUSCULAR
Qty: 10 ML | Refills: 0 | Status: SHIPPED | OUTPATIENT
Start: 2021-08-11

## 2021-08-11 RX ORDER — SYRINGE WITH NEEDLE, 1 ML 25GX5/8"
SYRINGE, EMPTY DISPOSABLE MISCELLANEOUS
Qty: 12 SYRINGE | Refills: 3 | Status: SHIPPED | OUTPATIENT
Start: 2021-08-11 | End: 2021-12-13 | Stop reason: SDUPTHER

## 2021-12-02 ENCOUNTER — PATIENT OUTREACH (OUTPATIENT)
Dept: ADMINISTRATIVE | Facility: OTHER | Age: 31
End: 2021-12-02

## 2021-12-13 DIAGNOSIS — F64.0 GENDER DYSPHORIA IN ADULT: ICD-10-CM

## 2021-12-13 RX ORDER — SYRINGE WITH NEEDLE, 1 ML 25GX5/8"
SYRINGE, EMPTY DISPOSABLE MISCELLANEOUS
Qty: 12 EACH | Refills: 3 | Status: SHIPPED | OUTPATIENT
Start: 2021-12-13

## 2021-12-13 RX ORDER — SYRINGE WITH NEEDLE, 1 ML 25GX5/8"
SYRINGE, EMPTY DISPOSABLE MISCELLANEOUS
Qty: 10 EACH | Refills: 5 | Status: SHIPPED | OUTPATIENT
Start: 2021-12-13

## 2022-01-06 ENCOUNTER — PATIENT OUTREACH (OUTPATIENT)
Dept: ADMINISTRATIVE | Facility: HOSPITAL | Age: 32
End: 2022-01-06

## 2022-04-27 ENCOUNTER — PATIENT MESSAGE (OUTPATIENT)
Dept: ADMINISTRATIVE | Facility: HOSPITAL | Age: 32
End: 2022-04-27

## 2022-07-27 ENCOUNTER — PATIENT MESSAGE (OUTPATIENT)
Dept: ADMINISTRATIVE | Facility: HOSPITAL | Age: 32
End: 2022-07-27

## 2022-08-24 DIAGNOSIS — I10 ESSENTIAL HYPERTENSION: ICD-10-CM

## 2022-10-20 ENCOUNTER — PATIENT MESSAGE (OUTPATIENT)
Dept: ADMINISTRATIVE | Facility: HOSPITAL | Age: 32
End: 2022-10-20